# Patient Record
Sex: MALE | Race: WHITE | NOT HISPANIC OR LATINO | Employment: OTHER | URBAN - METROPOLITAN AREA
[De-identification: names, ages, dates, MRNs, and addresses within clinical notes are randomized per-mention and may not be internally consistent; named-entity substitution may affect disease eponyms.]

---

## 2023-01-01 ENCOUNTER — APPOINTMENT (OUTPATIENT)
Dept: RADIOLOGY | Facility: MEDICAL CENTER | Age: 63
DRG: 025 | End: 2023-01-01
Attending: NEUROLOGICAL SURGERY
Payer: MEDICAID

## 2023-01-01 ENCOUNTER — APPOINTMENT (OUTPATIENT)
Dept: MEDICAL GROUP | Facility: CLINIC | Age: 63
End: 2023-01-01
Payer: MEDICAID

## 2023-01-01 ENCOUNTER — APPOINTMENT (OUTPATIENT)
Dept: CARDIOLOGY | Facility: MEDICAL CENTER | Age: 63
DRG: 025 | End: 2023-01-01
Attending: NURSE PRACTITIONER
Payer: MEDICAID

## 2023-01-01 ENCOUNTER — PRE-ADMISSION TESTING (OUTPATIENT)
Dept: ADMISSIONS | Facility: MEDICAL CENTER | Age: 63
DRG: 025 | End: 2023-01-01
Attending: NEUROLOGICAL SURGERY
Payer: MEDICAID

## 2023-01-01 ENCOUNTER — ANESTHESIA (OUTPATIENT)
Dept: SURGERY | Facility: MEDICAL CENTER | Age: 63
DRG: 025 | End: 2023-01-01
Payer: MEDICAID

## 2023-01-01 ENCOUNTER — TELEPHONE (OUTPATIENT)
Dept: PALLIATIVE MEDICINE | Facility: HOSPICE | Age: 63
End: 2023-01-01

## 2023-01-01 ENCOUNTER — HOSPITAL ENCOUNTER (INPATIENT)
Facility: MEDICAL CENTER | Age: 63
LOS: 5 days | DRG: 025 | End: 2023-03-20
Attending: NEUROLOGICAL SURGERY | Admitting: NEUROLOGICAL SURGERY
Payer: MEDICAID

## 2023-01-01 ENCOUNTER — ANESTHESIA EVENT (OUTPATIENT)
Dept: SURGERY | Facility: MEDICAL CENTER | Age: 63
DRG: 025 | End: 2023-01-01
Payer: MEDICAID

## 2023-01-01 ENCOUNTER — TELEPHONE (OUTPATIENT)
Dept: PALLIATIVE MEDICINE | Facility: HOSPICE | Age: 63
End: 2023-01-01
Payer: MEDICAID

## 2023-01-01 ENCOUNTER — OFFICE VISIT (OUTPATIENT)
Dept: MEDICAL GROUP | Facility: CLINIC | Age: 63
End: 2023-01-01
Payer: MEDICAID

## 2023-01-01 VITALS
SYSTOLIC BLOOD PRESSURE: 116 MMHG | HEART RATE: 66 BPM | TEMPERATURE: 97.5 F | BODY MASS INDEX: 24.6 KG/M2 | DIASTOLIC BLOOD PRESSURE: 78 MMHG | RESPIRATION RATE: 16 BRPM | WEIGHT: 185.63 LBS | HEIGHT: 73 IN | OXYGEN SATURATION: 95 %

## 2023-01-01 VITALS
DIASTOLIC BLOOD PRESSURE: 74 MMHG | HEIGHT: 74 IN | HEART RATE: 76 BPM | TEMPERATURE: 98.2 F | WEIGHT: 185 LBS | SYSTOLIC BLOOD PRESSURE: 122 MMHG | BODY MASS INDEX: 23.74 KG/M2 | RESPIRATION RATE: 18 BRPM | OXYGEN SATURATION: 94 %

## 2023-01-01 DIAGNOSIS — C71.9 GLIOMA (HCC): ICD-10-CM

## 2023-01-01 DIAGNOSIS — R47.01 COMBINED RECEPTIVE AND EXPRESSIVE APHASIA: ICD-10-CM

## 2023-01-01 DIAGNOSIS — C71.9 GLIOBLASTOMA DETERMINED BY BIOPSY OF BRAIN (HCC): ICD-10-CM

## 2023-01-01 LAB
ABO + RH BLD: NORMAL
ABO GROUP BLD: NORMAL
ANION GAP SERPL CALC-SCNC: 10 MMOL/L (ref 7–16)
ANION GAP SERPL CALC-SCNC: 11 MMOL/L (ref 7–16)
ANION GAP SERPL CALC-SCNC: 8 MMOL/L (ref 7–16)
APTT PPP: <20 SEC (ref 24.7–36)
BASOPHILS # BLD AUTO: 0.1 % (ref 0–1.8)
BASOPHILS # BLD: 0.01 K/UL (ref 0–0.12)
BLD GP AB SCN SERPL QL: NORMAL
BUN SERPL-MCNC: 23 MG/DL (ref 8–22)
BUN SERPL-MCNC: 23 MG/DL (ref 8–22)
BUN SERPL-MCNC: 33 MG/DL (ref 8–22)
CALCIUM SERPL-MCNC: 8.2 MG/DL (ref 8.5–10.5)
CALCIUM SERPL-MCNC: 8.2 MG/DL (ref 8.5–10.5)
CALCIUM SERPL-MCNC: 8.7 MG/DL (ref 8.5–10.5)
CHLORIDE SERPL-SCNC: 104 MMOL/L (ref 96–112)
CHLORIDE SERPL-SCNC: 104 MMOL/L (ref 96–112)
CHLORIDE SERPL-SCNC: 107 MMOL/L (ref 96–112)
CO2 SERPL-SCNC: 21 MMOL/L (ref 20–33)
CO2 SERPL-SCNC: 22 MMOL/L (ref 20–33)
CO2 SERPL-SCNC: 23 MMOL/L (ref 20–33)
CREAT SERPL-MCNC: 0.85 MG/DL (ref 0.5–1.4)
CREAT SERPL-MCNC: 0.89 MG/DL (ref 0.5–1.4)
CREAT SERPL-MCNC: 1.07 MG/DL (ref 0.5–1.4)
EKG IMPRESSION: NORMAL
EKG IMPRESSION: NORMAL
EOSINOPHIL # BLD AUTO: 0 K/UL (ref 0–0.51)
EOSINOPHIL NFR BLD: 0 % (ref 0–6.9)
ERYTHROCYTE [DISTWIDTH] IN BLOOD BY AUTOMATED COUNT: 46.5 FL (ref 35.9–50)
ERYTHROCYTE [DISTWIDTH] IN BLOOD BY AUTOMATED COUNT: 46.5 FL (ref 35.9–50)
ERYTHROCYTE [DISTWIDTH] IN BLOOD BY AUTOMATED COUNT: 47.1 FL (ref 35.9–50)
GFR SERPLBLD CREATININE-BSD FMLA CKD-EPI: 77 ML/MIN/1.73 M 2
GFR SERPLBLD CREATININE-BSD FMLA CKD-EPI: 96 ML/MIN/1.73 M 2
GFR SERPLBLD CREATININE-BSD FMLA CKD-EPI: 97 ML/MIN/1.73 M 2
GLUCOSE SERPL-MCNC: 113 MG/DL (ref 65–99)
GLUCOSE SERPL-MCNC: 175 MG/DL (ref 65–99)
GLUCOSE SERPL-MCNC: 92 MG/DL (ref 65–99)
HCT VFR BLD AUTO: 42.4 % (ref 42–52)
HCT VFR BLD AUTO: 44.1 % (ref 42–52)
HCT VFR BLD AUTO: 45.2 % (ref 42–52)
HGB BLD-MCNC: 14.5 G/DL (ref 14–18)
HGB BLD-MCNC: 14.8 G/DL (ref 14–18)
HGB BLD-MCNC: 15.4 G/DL (ref 14–18)
IMM GRANULOCYTES # BLD AUTO: 0.15 K/UL (ref 0–0.11)
IMM GRANULOCYTES NFR BLD AUTO: 1.2 % (ref 0–0.9)
INR PPP: 1.03 (ref 0.87–1.13)
LV EJECT FRACT  99904: 70
LV EJECT FRACT MOD 2C 99903: 57.61
LV EJECT FRACT MOD 4C 99902: 50.17
LV EJECT FRACT MOD BP 99901: 59.01
LYMPHOCYTES # BLD AUTO: 0.28 K/UL (ref 1–4.8)
LYMPHOCYTES NFR BLD: 2.2 % (ref 22–41)
MAGNESIUM SERPL-MCNC: 1.9 MG/DL (ref 1.5–2.5)
MAGNESIUM SERPL-MCNC: 1.9 MG/DL (ref 1.5–2.5)
MCH RBC QN AUTO: 32.2 PG (ref 27–33)
MCH RBC QN AUTO: 32.4 PG (ref 27–33)
MCH RBC QN AUTO: 32.5 PG (ref 27–33)
MCHC RBC AUTO-ENTMCNC: 33.6 G/DL (ref 33.7–35.3)
MCHC RBC AUTO-ENTMCNC: 34.1 G/DL (ref 33.7–35.3)
MCHC RBC AUTO-ENTMCNC: 34.2 G/DL (ref 33.7–35.3)
MCV RBC AUTO: 94.2 FL (ref 81.4–97.8)
MCV RBC AUTO: 95 FL (ref 81.4–97.8)
MCV RBC AUTO: 96.7 FL (ref 81.4–97.8)
MONOCYTES # BLD AUTO: 0.68 K/UL (ref 0–0.85)
MONOCYTES NFR BLD AUTO: 5.3 % (ref 0–13.4)
NEUTROPHILS # BLD AUTO: 11.75 K/UL (ref 1.82–7.42)
NEUTROPHILS NFR BLD: 91.2 % (ref 44–72)
NRBC # BLD AUTO: 0 K/UL
NRBC BLD-RTO: 0 /100 WBC
PATHOLOGY CONSULT NOTE: NORMAL
PLATELET # BLD AUTO: 184 K/UL (ref 164–446)
PLATELET # BLD AUTO: 192 K/UL (ref 164–446)
PLATELET # BLD AUTO: 200 K/UL (ref 164–446)
PMV BLD AUTO: 9.1 FL (ref 9–12.9)
PMV BLD AUTO: 9.2 FL (ref 9–12.9)
PMV BLD AUTO: 9.5 FL (ref 9–12.9)
POTASSIUM SERPL-SCNC: 4.2 MMOL/L (ref 3.6–5.5)
POTASSIUM SERPL-SCNC: 4.2 MMOL/L (ref 3.6–5.5)
POTASSIUM SERPL-SCNC: 4.4 MMOL/L (ref 3.6–5.5)
PROTHROMBIN TIME: 13.4 SEC (ref 12–14.6)
RBC # BLD AUTO: 4.5 M/UL (ref 4.7–6.1)
RBC # BLD AUTO: 4.56 M/UL (ref 4.7–6.1)
RBC # BLD AUTO: 4.76 M/UL (ref 4.7–6.1)
RH BLD: NORMAL
SODIUM SERPL-SCNC: 135 MMOL/L (ref 135–145)
SODIUM SERPL-SCNC: 136 MMOL/L (ref 135–145)
SODIUM SERPL-SCNC: 139 MMOL/L (ref 135–145)
WBC # BLD AUTO: 12.9 K/UL (ref 4.8–10.8)
WBC # BLD AUTO: 13.3 K/UL (ref 4.8–10.8)
WBC # BLD AUTO: 14.7 K/UL (ref 4.8–10.8)

## 2023-01-01 PROCEDURE — A9270 NON-COVERED ITEM OR SERVICE: HCPCS | Performed by: NURSE PRACTITIONER

## 2023-01-01 PROCEDURE — 92610 EVALUATE SWALLOWING FUNCTION: CPT

## 2023-01-01 PROCEDURE — 36415 COLL VENOUS BLD VENIPUNCTURE: CPT

## 2023-01-01 PROCEDURE — 99204 OFFICE O/P NEW MOD 45 MIN: CPT | Mod: GC | Performed by: STUDENT IN AN ORGANIZED HEALTH CARE EDUCATION/TRAINING PROGRAM

## 2023-01-01 PROCEDURE — 36620 INSERTION CATHETER ARTERY: CPT | Performed by: ANESTHESIOLOGY

## 2023-01-01 PROCEDURE — 99254 IP/OBS CNSLTJ NEW/EST MOD 60: CPT | Performed by: INTERNAL MEDICINE

## 2023-01-01 PROCEDURE — 99232 SBSQ HOSP IP/OBS MODERATE 35: CPT | Mod: GC | Performed by: INTERNAL MEDICINE

## 2023-01-01 PROCEDURE — 700101 HCHG RX REV CODE 250: Performed by: ANESTHESIOLOGY

## 2023-01-01 PROCEDURE — 160048 HCHG OR STATISTICAL LEVEL 1-5: Performed by: NEUROLOGICAL SURGERY

## 2023-01-01 PROCEDURE — 93306 TTE W/DOPPLER COMPLETE: CPT

## 2023-01-01 PROCEDURE — 700102 HCHG RX REV CODE 250 W/ 637 OVERRIDE(OP): Performed by: NURSE PRACTITIONER

## 2023-01-01 PROCEDURE — 83735 ASSAY OF MAGNESIUM: CPT

## 2023-01-01 PROCEDURE — 85730 THROMBOPLASTIN TIME PARTIAL: CPT

## 2023-01-01 PROCEDURE — 93005 ELECTROCARDIOGRAM TRACING: CPT | Performed by: NEUROLOGICAL SURGERY

## 2023-01-01 PROCEDURE — 160009 HCHG ANES TIME/MIN: Performed by: NEUROLOGICAL SURGERY

## 2023-01-01 PROCEDURE — 770020 HCHG ROOM/CARE - TELE (206)

## 2023-01-01 PROCEDURE — 88307 TISSUE EXAM BY PATHOLOGIST: CPT

## 2023-01-01 PROCEDURE — 86850 RBC ANTIBODY SCREEN: CPT

## 2023-01-01 PROCEDURE — 160031 HCHG SURGERY MINUTES - 1ST 30 MINS LEVEL 5: Performed by: NEUROLOGICAL SURGERY

## 2023-01-01 PROCEDURE — 700111 HCHG RX REV CODE 636 W/ 250 OVERRIDE (IP): Performed by: ANESTHESIOLOGY

## 2023-01-01 PROCEDURE — 80048 BASIC METABOLIC PNL TOTAL CA: CPT

## 2023-01-01 PROCEDURE — 700111 HCHG RX REV CODE 636 W/ 250 OVERRIDE (IP): Performed by: NURSE PRACTITIONER

## 2023-01-01 PROCEDURE — 700105 HCHG RX REV CODE 258: Performed by: ANESTHESIOLOGY

## 2023-01-01 PROCEDURE — 99291 CRITICAL CARE FIRST HOUR: CPT | Performed by: INTERNAL MEDICINE

## 2023-01-01 PROCEDURE — 86900 BLOOD TYPING SEROLOGIC ABO: CPT

## 2023-01-01 PROCEDURE — 160002 HCHG RECOVERY MINUTES (STAT): Performed by: NEUROLOGICAL SURGERY

## 2023-01-01 PROCEDURE — 700111 HCHG RX REV CODE 636 W/ 250 OVERRIDE (IP): Performed by: NEUROLOGICAL SURGERY

## 2023-01-01 PROCEDURE — 700117 HCHG RX CONTRAST REV CODE 255: Performed by: NEUROLOGICAL SURGERY

## 2023-01-01 PROCEDURE — 770006 HCHG ROOM/CARE - MED/SURG/GYN SEMI*

## 2023-01-01 PROCEDURE — 700105 HCHG RX REV CODE 258: Performed by: NURSE PRACTITIONER

## 2023-01-01 PROCEDURE — 8E093CZ ROBOTIC ASSISTED PROCEDURE OF HEAD AND NECK REGION, PERCUTANEOUS APPROACH: ICD-10-PCS | Performed by: NEUROLOGICAL SURGERY

## 2023-01-01 PROCEDURE — 86901 BLOOD TYPING SEROLOGIC RH(D): CPT

## 2023-01-01 PROCEDURE — 97163 PT EVAL HIGH COMPLEX 45 MIN: CPT

## 2023-01-01 PROCEDURE — 700101 HCHG RX REV CODE 250: Performed by: NEUROLOGICAL SURGERY

## 2023-01-01 PROCEDURE — 700117 HCHG RX CONTRAST REV CODE 255: Performed by: NURSE PRACTITIONER

## 2023-01-01 PROCEDURE — 97167 OT EVAL HIGH COMPLEX 60 MIN: CPT

## 2023-01-01 PROCEDURE — 00210 ANES INTRACRANIAL PX NOS: CPT | Performed by: ANESTHESIOLOGY

## 2023-01-01 PROCEDURE — 97166 OT EVAL MOD COMPLEX 45 MIN: CPT

## 2023-01-01 PROCEDURE — 00B73ZX EXCISION OF CEREBRAL HEMISPHERE, PERCUTANEOUS APPROACH, DIAGNOSTIC: ICD-10-PCS | Performed by: NEUROLOGICAL SURGERY

## 2023-01-01 PROCEDURE — 160036 HCHG PACU - EA ADDL 30 MINS PHASE I: Performed by: NEUROLOGICAL SURGERY

## 2023-01-01 PROCEDURE — 99233 SBSQ HOSP IP/OBS HIGH 50: CPT | Mod: FS | Performed by: INTERNAL MEDICINE

## 2023-01-01 PROCEDURE — 85027 COMPLETE CBC AUTOMATED: CPT

## 2023-01-01 PROCEDURE — A9579 GAD-BASE MR CONTRAST NOS,1ML: HCPCS | Performed by: NEUROLOGICAL SURGERY

## 2023-01-01 PROCEDURE — 70553 MRI BRAIN STEM W/O & W/DYE: CPT

## 2023-01-01 PROCEDURE — 93005 ELECTROCARDIOGRAM TRACING: CPT | Performed by: STUDENT IN AN ORGANIZED HEALTH CARE EDUCATION/TRAINING PROGRAM

## 2023-01-01 PROCEDURE — 96105 ASSESSMENT OF APHASIA: CPT

## 2023-01-01 PROCEDURE — 160035 HCHG PACU - 1ST 60 MINS PHASE I: Performed by: NEUROLOGICAL SURGERY

## 2023-01-01 PROCEDURE — 93306 TTE W/DOPPLER COMPLETE: CPT | Mod: 26 | Performed by: INTERNAL MEDICINE

## 2023-01-01 PROCEDURE — 85025 COMPLETE CBC W/AUTO DIFF WBC: CPT

## 2023-01-01 PROCEDURE — 160042 HCHG SURGERY MINUTES - EA ADDL 1 MIN LEVEL 5: Performed by: NEUROLOGICAL SURGERY

## 2023-01-01 PROCEDURE — 88331 PATH CONSLTJ SURG 1 BLK 1SPC: CPT

## 2023-01-01 PROCEDURE — 502714 HCHG ROBOTIC SURGERY SERVICES: Performed by: NEUROLOGICAL SURGERY

## 2023-01-01 PROCEDURE — 85610 PROTHROMBIN TIME: CPT

## 2023-01-01 PROCEDURE — 51798 US URINE CAPACITY MEASURE: CPT

## 2023-01-01 PROCEDURE — 93010 ELECTROCARDIOGRAM REPORT: CPT | Performed by: INTERNAL MEDICINE

## 2023-01-01 PROCEDURE — 770022 HCHG ROOM/CARE - ICU (200)

## 2023-01-01 PROCEDURE — 110454 HCHG SHELL REV 250: Performed by: NEUROLOGICAL SURGERY

## 2023-01-01 RX ORDER — DEXAMETHASONE 4 MG/1
1 TABLET ORAL 4 TIMES DAILY
COMMUNITY
End: 2023-01-01 | Stop reason: SDUPTHER

## 2023-01-01 RX ORDER — LIDOCAINE HYDROCHLORIDE 20 MG/ML
INJECTION, SOLUTION EPIDURAL; INFILTRATION; INTRACAUDAL; PERINEURAL PRN
Status: DISCONTINUED | OUTPATIENT
Start: 2023-01-01 | End: 2023-01-01 | Stop reason: SURG

## 2023-01-01 RX ORDER — ACETAMINOPHEN 500 MG
1000 TABLET ORAL EVERY 6 HOURS
Status: DISCONTINUED | OUTPATIENT
Start: 2023-01-01 | End: 2023-01-01 | Stop reason: HOSPADM

## 2023-01-01 RX ORDER — DOCUSATE SODIUM 100 MG/1
100 CAPSULE, LIQUID FILLED ORAL 2 TIMES DAILY
Status: DISCONTINUED | OUTPATIENT
Start: 2023-01-01 | End: 2023-01-01 | Stop reason: HOSPADM

## 2023-01-01 RX ORDER — DEXAMETHASONE 4 MG/1
4 TABLET ORAL 3 TIMES DAILY
COMMUNITY

## 2023-01-01 RX ORDER — DEXAMETHASONE 6 MG/1
6 TABLET ORAL EVERY 8 HOURS
Status: DISCONTINUED | OUTPATIENT
Start: 2023-01-01 | End: 2023-01-01 | Stop reason: HOSPADM

## 2023-01-01 RX ORDER — LEVETIRACETAM 500 MG/1
500 TABLET ORAL 2 TIMES DAILY
Qty: 180 TABLET | Refills: 1 | Status: SHIPPED | OUTPATIENT
Start: 2023-01-01

## 2023-01-01 RX ORDER — SODIUM CHLORIDE 9 MG/ML
INJECTION, SOLUTION INTRAVENOUS
Status: DISCONTINUED | OUTPATIENT
Start: 2023-01-01 | End: 2023-01-01 | Stop reason: SURG

## 2023-01-01 RX ORDER — AMOXICILLIN 250 MG
1 CAPSULE ORAL NIGHTLY
Status: DISCONTINUED | OUTPATIENT
Start: 2023-01-01 | End: 2023-01-01 | Stop reason: HOSPADM

## 2023-01-01 RX ORDER — OXYCODONE HYDROCHLORIDE 10 MG/1
10 TABLET ORAL
Status: DISCONTINUED | OUTPATIENT
Start: 2023-01-01 | End: 2023-01-01 | Stop reason: HOSPADM

## 2023-01-01 RX ORDER — SODIUM CHLORIDE 9 MG/ML
INJECTION, SOLUTION INTRAVENOUS CONTINUOUS
Status: DISCONTINUED | OUTPATIENT
Start: 2023-01-01 | End: 2023-01-01

## 2023-01-01 RX ORDER — POLYETHYLENE GLYCOL 3350 17 G/17G
1 POWDER, FOR SOLUTION ORAL 2 TIMES DAILY PRN
Status: DISCONTINUED | OUTPATIENT
Start: 2023-01-01 | End: 2023-01-01 | Stop reason: HOSPADM

## 2023-01-01 RX ORDER — DEXAMETHASONE SODIUM PHOSPHATE 4 MG/ML
10 INJECTION, SOLUTION INTRA-ARTICULAR; INTRALESIONAL; INTRAMUSCULAR; INTRAVENOUS; SOFT TISSUE EVERY 12 HOURS
Status: COMPLETED | OUTPATIENT
Start: 2023-01-01 | End: 2023-01-01

## 2023-01-01 RX ORDER — OXYCODONE HYDROCHLORIDE 5 MG/1
5 TABLET ORAL
Status: DISCONTINUED | OUTPATIENT
Start: 2023-01-01 | End: 2023-01-01 | Stop reason: HOSPADM

## 2023-01-01 RX ORDER — GLYCOPYRROLATE 0.2 MG/ML
INJECTION INTRAMUSCULAR; INTRAVENOUS PRN
Status: DISCONTINUED | OUTPATIENT
Start: 2023-01-01 | End: 2023-01-01 | Stop reason: SURG

## 2023-01-01 RX ORDER — DIPHENHYDRAMINE HYDROCHLORIDE 50 MG/ML
12.5 INJECTION INTRAMUSCULAR; INTRAVENOUS
Status: DISCONTINUED | OUTPATIENT
Start: 2023-01-01 | End: 2023-01-01 | Stop reason: HOSPADM

## 2023-01-01 RX ORDER — DIPHENHYDRAMINE HCL 25 MG
25 TABLET ORAL EVERY 6 HOURS PRN
Status: DISCONTINUED | OUTPATIENT
Start: 2023-01-01 | End: 2023-01-01 | Stop reason: HOSPADM

## 2023-01-01 RX ORDER — DEXAMETHASONE 6 MG/1
6 TABLET ORAL EVERY 8 HOURS
Status: DISCONTINUED | OUTPATIENT
Start: 2023-01-01 | End: 2023-01-01

## 2023-01-01 RX ORDER — IPRATROPIUM BROMIDE AND ALBUTEROL SULFATE 2.5; .5 MG/3ML; MG/3ML
3 SOLUTION RESPIRATORY (INHALATION)
Status: DISCONTINUED | OUTPATIENT
Start: 2023-01-01 | End: 2023-01-01 | Stop reason: HOSPADM

## 2023-01-01 RX ORDER — ONDANSETRON 2 MG/ML
4 INJECTION INTRAMUSCULAR; INTRAVENOUS EVERY 4 HOURS PRN
Status: DISCONTINUED | OUTPATIENT
Start: 2023-01-01 | End: 2023-01-01 | Stop reason: HOSPADM

## 2023-01-01 RX ORDER — SODIUM CHLORIDE, SODIUM LACTATE, POTASSIUM CHLORIDE, CALCIUM CHLORIDE 600; 310; 30; 20 MG/100ML; MG/100ML; MG/100ML; MG/100ML
INJECTION, SOLUTION INTRAVENOUS CONTINUOUS
Status: ACTIVE | OUTPATIENT
Start: 2023-01-01 | End: 2023-01-01

## 2023-01-01 RX ORDER — DEXAMETHASONE SODIUM PHOSPHATE 4 MG/ML
INJECTION, SOLUTION INTRA-ARTICULAR; INTRALESIONAL; INTRAMUSCULAR; INTRAVENOUS; SOFT TISSUE PRN
Status: DISCONTINUED | OUTPATIENT
Start: 2023-01-01 | End: 2023-01-01 | Stop reason: SURG

## 2023-01-01 RX ORDER — HYDRALAZINE HYDROCHLORIDE 20 MG/ML
5 INJECTION INTRAMUSCULAR; INTRAVENOUS
Status: DISCONTINUED | OUTPATIENT
Start: 2023-01-01 | End: 2023-01-01 | Stop reason: HOSPADM

## 2023-01-01 RX ORDER — CLONIDINE HYDROCHLORIDE 0.1 MG/1
0.1 TABLET ORAL EVERY 4 HOURS PRN
Status: DISCONTINUED | OUTPATIENT
Start: 2023-01-01 | End: 2023-01-01 | Stop reason: HOSPADM

## 2023-01-01 RX ORDER — OXYCODONE HCL 5 MG/5 ML
5 SOLUTION, ORAL ORAL
Status: DISCONTINUED | OUTPATIENT
Start: 2023-01-01 | End: 2023-01-01 | Stop reason: HOSPADM

## 2023-01-01 RX ORDER — BUPIVACAINE HYDROCHLORIDE AND EPINEPHRINE 5; 5 MG/ML; UG/ML
INJECTION, SOLUTION PERINEURAL
Status: DISCONTINUED | OUTPATIENT
Start: 2023-01-01 | End: 2023-01-01 | Stop reason: HOSPADM

## 2023-01-01 RX ORDER — EPHEDRINE SULFATE 50 MG/ML
5 INJECTION, SOLUTION INTRAVENOUS
Status: DISCONTINUED | OUTPATIENT
Start: 2023-01-01 | End: 2023-01-01 | Stop reason: HOSPADM

## 2023-01-01 RX ORDER — FAMOTIDINE 20 MG/1
20 TABLET, FILM COATED ORAL 2 TIMES DAILY
Status: DISCONTINUED | OUTPATIENT
Start: 2023-01-01 | End: 2023-01-01 | Stop reason: HOSPADM

## 2023-01-01 RX ORDER — CEFAZOLIN SODIUM 1 G/3ML
INJECTION, POWDER, FOR SOLUTION INTRAMUSCULAR; INTRAVENOUS PRN
Status: DISCONTINUED | OUTPATIENT
Start: 2023-01-01 | End: 2023-01-01 | Stop reason: SURG

## 2023-01-01 RX ORDER — OXYCODONE HCL 5 MG/5 ML
10 SOLUTION, ORAL ORAL
Status: DISCONTINUED | OUTPATIENT
Start: 2023-01-01 | End: 2023-01-01 | Stop reason: HOSPADM

## 2023-01-01 RX ORDER — MORPHINE SULFATE 4 MG/ML
2-4 INJECTION INTRAVENOUS
Status: DISCONTINUED | OUTPATIENT
Start: 2023-01-01 | End: 2023-01-01 | Stop reason: HOSPADM

## 2023-01-01 RX ORDER — ONDANSETRON 2 MG/ML
INJECTION INTRAMUSCULAR; INTRAVENOUS PRN
Status: DISCONTINUED | OUTPATIENT
Start: 2023-01-01 | End: 2023-01-01 | Stop reason: SURG

## 2023-01-01 RX ORDER — DIPHENHYDRAMINE HYDROCHLORIDE 50 MG/ML
25 INJECTION INTRAMUSCULAR; INTRAVENOUS EVERY 6 HOURS PRN
Status: DISCONTINUED | OUTPATIENT
Start: 2023-01-01 | End: 2023-01-01 | Stop reason: HOSPADM

## 2023-01-01 RX ORDER — HYDROMORPHONE HYDROCHLORIDE 1 MG/ML
0.4 INJECTION, SOLUTION INTRAMUSCULAR; INTRAVENOUS; SUBCUTANEOUS
Status: DISCONTINUED | OUTPATIENT
Start: 2023-01-01 | End: 2023-01-01 | Stop reason: HOSPADM

## 2023-01-01 RX ORDER — ONDANSETRON 2 MG/ML
4 INJECTION INTRAMUSCULAR; INTRAVENOUS
Status: DISCONTINUED | OUTPATIENT
Start: 2023-01-01 | End: 2023-01-01 | Stop reason: HOSPADM

## 2023-01-01 RX ORDER — DEXAMETHASONE 4 MG/1
4 TABLET ORAL 4 TIMES DAILY
Qty: 120 TABLET | Refills: 0 | Status: ON HOLD | OUTPATIENT
Start: 2023-01-01 | End: 2023-01-01

## 2023-01-01 RX ORDER — HYDROMORPHONE HYDROCHLORIDE 1 MG/ML
0.2 INJECTION, SOLUTION INTRAMUSCULAR; INTRAVENOUS; SUBCUTANEOUS
Status: DISCONTINUED | OUTPATIENT
Start: 2023-01-01 | End: 2023-01-01 | Stop reason: HOSPADM

## 2023-01-01 RX ORDER — METOPROLOL TARTRATE 1 MG/ML
1 INJECTION, SOLUTION INTRAVENOUS
Status: DISCONTINUED | OUTPATIENT
Start: 2023-01-01 | End: 2023-01-01 | Stop reason: HOSPADM

## 2023-01-01 RX ORDER — LEVETIRACETAM 500 MG/1
500 TABLET ORAL 2 TIMES DAILY
Status: DISCONTINUED | OUTPATIENT
Start: 2023-01-01 | End: 2023-01-01 | Stop reason: HOSPADM

## 2023-01-01 RX ORDER — ACETAMINOPHEN 500 MG
1000 TABLET ORAL EVERY 6 HOURS PRN
Status: DISCONTINUED | OUTPATIENT
Start: 2023-01-01 | End: 2023-01-01 | Stop reason: HOSPADM

## 2023-01-01 RX ORDER — BISACODYL 10 MG
10 SUPPOSITORY, RECTAL RECTAL
Status: DISCONTINUED | OUTPATIENT
Start: 2023-01-01 | End: 2023-01-01 | Stop reason: HOSPADM

## 2023-01-01 RX ORDER — ROCURONIUM BROMIDE 10 MG/ML
INJECTION, SOLUTION INTRAVENOUS PRN
Status: DISCONTINUED | OUTPATIENT
Start: 2023-01-01 | End: 2023-01-01 | Stop reason: SURG

## 2023-01-01 RX ORDER — HALOPERIDOL 5 MG/ML
1 INJECTION INTRAMUSCULAR
Status: DISCONTINUED | OUTPATIENT
Start: 2023-01-01 | End: 2023-01-01 | Stop reason: HOSPADM

## 2023-01-01 RX ORDER — AMOXICILLIN 250 MG
1 CAPSULE ORAL
Status: DISCONTINUED | OUTPATIENT
Start: 2023-01-01 | End: 2023-01-01 | Stop reason: HOSPADM

## 2023-01-01 RX ORDER — CEFAZOLIN SODIUM 1 G/3ML
INJECTION, POWDER, FOR SOLUTION INTRAMUSCULAR; INTRAVENOUS
Status: DISCONTINUED | OUTPATIENT
Start: 2023-01-01 | End: 2023-01-01 | Stop reason: HOSPADM

## 2023-01-01 RX ORDER — ENEMA 19; 7 G/133ML; G/133ML
1 ENEMA RECTAL
Status: DISCONTINUED | OUTPATIENT
Start: 2023-01-01 | End: 2023-01-01 | Stop reason: HOSPADM

## 2023-01-01 RX ORDER — LEVETIRACETAM 500 MG/1
500 TABLET ORAL 2 TIMES DAILY
COMMUNITY
End: 2023-01-01 | Stop reason: SDUPTHER

## 2023-01-01 RX ORDER — HYDROMORPHONE HYDROCHLORIDE 1 MG/ML
0.1 INJECTION, SOLUTION INTRAMUSCULAR; INTRAVENOUS; SUBCUTANEOUS
Status: DISCONTINUED | OUTPATIENT
Start: 2023-01-01 | End: 2023-01-01 | Stop reason: HOSPADM

## 2023-01-01 RX ORDER — LABETALOL HYDROCHLORIDE 5 MG/ML
10 INJECTION, SOLUTION INTRAVENOUS
Status: DISCONTINUED | OUTPATIENT
Start: 2023-01-01 | End: 2023-01-01 | Stop reason: HOSPADM

## 2023-01-01 RX ADMIN — LEVETIRACETAM 500 MG: 500 TABLET, FILM COATED ORAL at 04:18

## 2023-01-01 RX ADMIN — FENTANYL CITRATE 50 MCG: 50 INJECTION, SOLUTION INTRAMUSCULAR; INTRAVENOUS at 14:40

## 2023-01-01 RX ADMIN — FAMOTIDINE 20 MG: 20 TABLET ORAL at 17:14

## 2023-01-01 RX ADMIN — DEXAMETHASONE 6 MG: 6 TABLET ORAL at 09:31

## 2023-01-01 RX ADMIN — DEXAMETHASONE 6 MG: 6 TABLET ORAL at 00:05

## 2023-01-01 RX ADMIN — DEXAMETHASONE 6 MG: 6 TABLET ORAL at 00:19

## 2023-01-01 RX ADMIN — DEXAMETHASONE 6 MG: 6 TABLET ORAL at 13:09

## 2023-01-01 RX ADMIN — SODIUM CHLORIDE: 9 INJECTION, SOLUTION INTRAVENOUS at 09:22

## 2023-01-01 RX ADMIN — SODIUM CHLORIDE: 9 INJECTION, SOLUTION INTRAVENOUS at 22:49

## 2023-01-01 RX ADMIN — LEVETIRACETAM 500 MG: 500 TABLET, FILM COATED ORAL at 17:14

## 2023-01-01 RX ADMIN — LEVETIRACETAM 500 MG: 500 TABLET, FILM COATED ORAL at 17:17

## 2023-01-01 RX ADMIN — DEXAMETHASONE 6 MG: 6 TABLET ORAL at 17:21

## 2023-01-01 RX ADMIN — DOCUSATE SODIUM 100 MG: 100 CAPSULE, LIQUID FILLED ORAL at 17:03

## 2023-01-01 RX ADMIN — DEXAMETHASONE 6 MG: 6 TABLET ORAL at 17:14

## 2023-01-01 RX ADMIN — LEVETIRACETAM 500 MG: 500 TABLET, FILM COATED ORAL at 05:36

## 2023-01-01 RX ADMIN — FENTANYL CITRATE 50 MCG: 50 INJECTION, SOLUTION INTRAMUSCULAR; INTRAVENOUS at 14:47

## 2023-01-01 RX ADMIN — ACETAMINOPHEN 1000 MG: 500 TABLET, FILM COATED ORAL at 05:18

## 2023-01-01 RX ADMIN — DOCUSATE SODIUM 100 MG: 100 CAPSULE, LIQUID FILLED ORAL at 17:18

## 2023-01-01 RX ADMIN — DEXAMETHASONE 6 MG: 6 TABLET ORAL at 10:50

## 2023-01-01 RX ADMIN — CEFAZOLIN 2 G: 2 INJECTION, POWDER, FOR SOLUTION INTRAMUSCULAR; INTRAVENOUS at 20:09

## 2023-01-01 RX ADMIN — OXYCODONE HYDROCHLORIDE 5 MG: 5 TABLET ORAL at 10:10

## 2023-01-01 RX ADMIN — SODIUM CHLORIDE: 9 INJECTION, SOLUTION INTRAVENOUS at 12:55

## 2023-01-01 RX ADMIN — FAMOTIDINE 20 MG: 20 TABLET ORAL at 05:18

## 2023-01-01 RX ADMIN — DOCUSATE SODIUM 100 MG: 100 CAPSULE, LIQUID FILLED ORAL at 04:18

## 2023-01-01 RX ADMIN — FENTANYL CITRATE 100 MCG: 50 INJECTION, SOLUTION INTRAMUSCULAR; INTRAVENOUS at 12:13

## 2023-01-01 RX ADMIN — ONDANSETRON 4 MG: 2 INJECTION INTRAMUSCULAR; INTRAVENOUS at 13:33

## 2023-01-01 RX ADMIN — LEVETIRACETAM 500 MG: 500 TABLET, FILM COATED ORAL at 17:09

## 2023-01-01 RX ADMIN — ACETAMINOPHEN 1000 MG: 500 TABLET, FILM COATED ORAL at 23:59

## 2023-01-01 RX ADMIN — ACETAMINOPHEN 1000 MG: 500 TABLET, FILM COATED ORAL at 05:36

## 2023-01-01 RX ADMIN — HYDROMORPHONE HYDROCHLORIDE 0.4 MG: 1 INJECTION, SOLUTION INTRAMUSCULAR; INTRAVENOUS; SUBCUTANEOUS at 14:56

## 2023-01-01 RX ADMIN — CEFAZOLIN 2 G: 2 INJECTION, POWDER, FOR SOLUTION INTRAMUSCULAR; INTRAVENOUS at 05:00

## 2023-01-01 RX ADMIN — FAMOTIDINE 20 MG: 20 TABLET ORAL at 17:17

## 2023-01-01 RX ADMIN — LEVETIRACETAM 500 MG: 500 TABLET, FILM COATED ORAL at 05:38

## 2023-01-01 RX ADMIN — SENNOSIDES AND DOCUSATE SODIUM 1 TABLET: 50; 8.6 TABLET ORAL at 19:49

## 2023-01-01 RX ADMIN — DOCUSATE SODIUM 100 MG: 100 CAPSULE, LIQUID FILLED ORAL at 05:18

## 2023-01-01 RX ADMIN — LEVETIRACETAM 500 MG: 500 TABLET, FILM COATED ORAL at 04:56

## 2023-01-01 RX ADMIN — SODIUM CHLORIDE: 9 INJECTION, SOLUTION INTRAVENOUS at 10:54

## 2023-01-01 RX ADMIN — ACETAMINOPHEN 1000 MG: 500 TABLET, FILM COATED ORAL at 17:10

## 2023-01-01 RX ADMIN — FENTANYL CITRATE 100 MCG: 50 INJECTION, SOLUTION INTRAMUSCULAR; INTRAVENOUS at 13:09

## 2023-01-01 RX ADMIN — ACETAMINOPHEN 1000 MG: 500 TABLET, FILM COATED ORAL at 04:56

## 2023-01-01 RX ADMIN — DEXAMETHASONE SODIUM PHOSPHATE 10 MG: 4 INJECTION, SOLUTION INTRA-ARTICULAR; INTRALESIONAL; INTRAMUSCULAR; INTRAVENOUS; SOFT TISSUE at 04:18

## 2023-01-01 RX ADMIN — SUGAMMADEX 200 MG: 100 INJECTION, SOLUTION INTRAVENOUS at 13:49

## 2023-01-01 RX ADMIN — LIDOCAINE HYDROCHLORIDE 100 MG: 20 INJECTION, SOLUTION EPIDURAL; INFILTRATION; INTRACAUDAL at 12:19

## 2023-01-01 RX ADMIN — DOCUSATE SODIUM 100 MG: 100 CAPSULE, LIQUID FILLED ORAL at 17:21

## 2023-01-01 RX ADMIN — LEVETIRACETAM 500 MG: 500 TABLET, FILM COATED ORAL at 05:18

## 2023-01-01 RX ADMIN — ACETAMINOPHEN 1000 MG: 500 TABLET, FILM COATED ORAL at 00:10

## 2023-01-01 RX ADMIN — FAMOTIDINE 20 MG: 20 TABLET ORAL at 04:56

## 2023-01-01 RX ADMIN — FAMOTIDINE 20 MG: 20 TABLET ORAL at 04:18

## 2023-01-01 RX ADMIN — DEXAMETHASONE 6 MG: 6 TABLET ORAL at 00:00

## 2023-01-01 RX ADMIN — DOCUSATE SODIUM 100 MG: 100 CAPSULE, LIQUID FILLED ORAL at 17:09

## 2023-01-01 RX ADMIN — SODIUM CHLORIDE: 9 INJECTION, SOLUTION INTRAVENOUS at 04:00

## 2023-01-01 RX ADMIN — DEXAMETHASONE 6 MG: 6 TABLET ORAL at 10:57

## 2023-01-01 RX ADMIN — CEFAZOLIN 2 G: 330 INJECTION, POWDER, FOR SOLUTION INTRAMUSCULAR; INTRAVENOUS at 12:45

## 2023-01-01 RX ADMIN — SODIUM CHLORIDE: 9 INJECTION, SOLUTION INTRAVENOUS at 16:03

## 2023-01-01 RX ADMIN — DEXAMETHASONE 6 MG: 6 TABLET ORAL at 00:10

## 2023-01-01 RX ADMIN — FAMOTIDINE 20 MG: 10 INJECTION, SOLUTION INTRAVENOUS at 05:37

## 2023-01-01 RX ADMIN — SODIUM CHLORIDE: 9 INJECTION, SOLUTION INTRAVENOUS at 13:44

## 2023-01-01 RX ADMIN — ACETAMINOPHEN 1000 MG: 500 TABLET, FILM COATED ORAL at 17:17

## 2023-01-01 RX ADMIN — FAMOTIDINE 20 MG: 20 TABLET ORAL at 17:09

## 2023-01-01 RX ADMIN — FAMOTIDINE 20 MG: 20 TABLET ORAL at 05:38

## 2023-01-01 RX ADMIN — ACETAMINOPHEN 1000 MG: 500 TABLET, FILM COATED ORAL at 11:25

## 2023-01-01 RX ADMIN — ACETAMINOPHEN 1000 MG: 500 TABLET, FILM COATED ORAL at 13:09

## 2023-01-01 RX ADMIN — HYDROMORPHONE HYDROCHLORIDE 0.4 MG: 1 INJECTION, SOLUTION INTRAMUSCULAR; INTRAVENOUS; SUBCUTANEOUS at 14:51

## 2023-01-01 RX ADMIN — ACETAMINOPHEN 1000 MG: 500 TABLET, FILM COATED ORAL at 17:21

## 2023-01-01 RX ADMIN — FAMOTIDINE 20 MG: 20 TABLET ORAL at 17:21

## 2023-01-01 RX ADMIN — DOCUSATE SODIUM 100 MG: 100 CAPSULE, LIQUID FILLED ORAL at 17:15

## 2023-01-01 RX ADMIN — SENNOSIDES AND DOCUSATE SODIUM 1 TABLET: 50; 8.6 TABLET ORAL at 22:35

## 2023-01-01 RX ADMIN — ROCURONIUM BROMIDE 50 MG: 10 INJECTION, SOLUTION INTRAVENOUS at 12:19

## 2023-01-01 RX ADMIN — ACETAMINOPHEN 1000 MG: 500 TABLET, FILM COATED ORAL at 00:20

## 2023-01-01 RX ADMIN — ACETAMINOPHEN 1000 MG: 500 TABLET, FILM COATED ORAL at 17:14

## 2023-01-01 RX ADMIN — DEXAMETHASONE 6 MG: 6 TABLET ORAL at 17:18

## 2023-01-01 RX ADMIN — MORPHINE SULFATE 2 MG: 4 INJECTION INTRAVENOUS at 11:28

## 2023-01-01 RX ADMIN — DEXAMETHASONE SODIUM PHOSPHATE 10 MG: 4 INJECTION, SOLUTION INTRA-ARTICULAR; INTRALESIONAL; INTRAMUSCULAR; INTRAVENOUS; SOFT TISSUE at 17:10

## 2023-01-01 RX ADMIN — PROPOFOL 200 MG: 10 INJECTION, EMULSION INTRAVENOUS at 12:19

## 2023-01-01 RX ADMIN — GLYCOPYRROLATE 0.2 MG: 0.2 INJECTION INTRAMUSCULAR; INTRAVENOUS at 13:00

## 2023-01-01 RX ADMIN — ACETAMINOPHEN 1000 MG: 500 TABLET, FILM COATED ORAL at 00:02

## 2023-01-01 RX ADMIN — LEVETIRACETAM 500 MG: 500 TABLET, FILM COATED ORAL at 17:03

## 2023-01-01 RX ADMIN — SODIUM CHLORIDE: 9 INJECTION, SOLUTION INTRAVENOUS at 12:09

## 2023-01-01 RX ADMIN — FAMOTIDINE 20 MG: 20 TABLET ORAL at 17:03

## 2023-01-01 RX ADMIN — ACETAMINOPHEN 1000 MG: 500 TABLET, FILM COATED ORAL at 05:38

## 2023-01-01 RX ADMIN — DEXAMETHASONE 6 MG: 6 TABLET ORAL at 17:03

## 2023-01-01 RX ADMIN — SODIUM CHLORIDE: 9 INJECTION, SOLUTION INTRAVENOUS at 18:21

## 2023-01-01 RX ADMIN — ROCURONIUM BROMIDE 20 MG: 10 INJECTION, SOLUTION INTRAVENOUS at 13:04

## 2023-01-01 RX ADMIN — GADOTERIDOL 16 ML: 279.3 INJECTION, SOLUTION INTRAVENOUS at 11:48

## 2023-01-01 RX ADMIN — DOCUSATE SODIUM 100 MG: 100 CAPSULE, LIQUID FILLED ORAL at 05:37

## 2023-01-01 RX ADMIN — LEVETIRACETAM 500 MG: 500 TABLET, FILM COATED ORAL at 17:21

## 2023-01-01 RX ADMIN — DEXAMETHASONE SODIUM PHOSPHATE 10 MG: 4 INJECTION, SOLUTION INTRA-ARTICULAR; INTRALESIONAL; INTRAMUSCULAR; INTRAVENOUS; SOFT TISSUE at 13:33

## 2023-01-01 RX ADMIN — HUMAN ALBUMIN MICROSPHERES AND PERFLUTREN 3 ML: 10; .22 INJECTION, SOLUTION INTRAVENOUS at 12:40

## 2023-01-01 RX ADMIN — DOCUSATE SODIUM 100 MG: 100 CAPSULE, LIQUID FILLED ORAL at 05:36

## 2023-01-01 ASSESSMENT — COGNITIVE AND FUNCTIONAL STATUS - GENERAL
MOBILITY SCORE: 16
EATING MEALS: A LITTLE
DRESSING REGULAR LOWER BODY CLOTHING: A LITTLE
SUGGESTED CMS G CODE MODIFIER MOBILITY: CK
DAILY ACTIVITIY SCORE: 22
STANDING UP FROM CHAIR USING ARMS: A LITTLE
MOBILITY SCORE: 19
SUGGESTED CMS G CODE MODIFIER DAILY ACTIVITY: CJ
PERSONAL GROOMING: A LITTLE
EATING MEALS: A LITTLE
WALKING IN HOSPITAL ROOM: A LITTLE
EATING MEALS: A LITTLE
TOILETING: A LITTLE
TOILETING: A LITTLE
CLIMB 3 TO 5 STEPS WITH RAILING: A LOT
PERSONAL GROOMING: A LITTLE
MOVING FROM LYING ON BACK TO SITTING ON SIDE OF FLAT BED: A LITTLE
HELP NEEDED FOR BATHING: A LITTLE
STANDING UP FROM CHAIR USING ARMS: A LOT
MOVING FROM LYING ON BACK TO SITTING ON SIDE OF FLAT BED: UNABLE
SUGGESTED CMS G CODE MODIFIER DAILY ACTIVITY: CK
MOVING TO AND FROM BED TO CHAIR: A LITTLE
DAILY ACTIVITIY SCORE: 18
CLIMB 3 TO 5 STEPS WITH RAILING: A LITTLE
MOVING TO AND FROM BED TO CHAIR: A LITTLE
WALKING IN HOSPITAL ROOM: TOTAL
SUGGESTED CMS G CODE MODIFIER DAILY ACTIVITY: CK
CLIMB 3 TO 5 STEPS WITH RAILING: TOTAL
MOVING TO AND FROM BED TO CHAIR: UNABLE
STANDING UP FROM CHAIR USING ARMS: TOTAL
DRESSING REGULAR UPPER BODY CLOTHING: A LITTLE
SUGGESTED CMS G CODE MODIFIER MOBILITY: CK
DRESSING REGULAR UPPER BODY CLOTHING: A LITTLE
MOBILITY SCORE: 9
HELP NEEDED FOR BATHING: A LITTLE
DAILY ACTIVITIY SCORE: 18
WALKING IN HOSPITAL ROOM: A LOT
HELP NEEDED FOR BATHING: A LITTLE
DRESSING REGULAR LOWER BODY CLOTHING: A LITTLE
SUGGESTED CMS G CODE MODIFIER MOBILITY: CM
MOVING FROM LYING ON BACK TO SITTING ON SIDE OF FLAT BED: A LITTLE

## 2023-01-01 ASSESSMENT — LIFESTYLE VARIABLES
TOTAL SCORE: 0
HOW MANY TIMES IN THE PAST YEAR HAVE YOU HAD 5 OR MORE DRINKS IN A DAY: 0
AVERAGE NUMBER OF DAYS PER WEEK YOU HAVE A DRINK CONTAINING ALCOHOL: 1
EVER FELT BAD OR GUILTY ABOUT YOUR DRINKING: NO
CONSUMPTION TOTAL: NEGATIVE
ON A TYPICAL DAY WHEN YOU DRINK ALCOHOL HOW MANY DRINKS DO YOU HAVE: 1
HAVE YOU EVER FELT YOU SHOULD CUT DOWN ON YOUR DRINKING: NO
EVER HAD A DRINK FIRST THING IN THE MORNING TO STEADY YOUR NERVES TO GET RID OF A HANGOVER: NO
DOES PATIENT WANT TO STOP DRINKING: NO
HAVE PEOPLE ANNOYED YOU BY CRITICIZING YOUR DRINKING: NO
ALCOHOL_USE: YES
TOTAL SCORE: 0
TOTAL SCORE: 0

## 2023-01-01 ASSESSMENT — PAIN DESCRIPTION - PAIN TYPE
TYPE: ACUTE PAIN
TYPE: ACUTE PAIN;SURGICAL PAIN
TYPE: ACUTE PAIN
TYPE: ACUTE PAIN;SURGICAL PAIN
TYPE: ACUTE PAIN
TYPE: ACUTE PAIN;SURGICAL PAIN

## 2023-01-01 ASSESSMENT — ENCOUNTER SYMPTOMS
COUGH: 0
SHORTNESS OF BREATH: 0
CHEST TIGHTNESS: 0
CHOKING: 0

## 2023-01-01 ASSESSMENT — GAIT ASSESSMENTS
GAIT LEVEL OF ASSIST: CONTACT GUARD ASSIST
DISTANCE (FEET): 150
ASSISTIVE DEVICE: HAND HELD ASSIST

## 2023-01-01 ASSESSMENT — ACTIVITIES OF DAILY LIVING (ADL): TOILETING: INDEPENDENT

## 2023-01-01 ASSESSMENT — PATIENT HEALTH QUESTIONNAIRE - PHQ9
SUM OF ALL RESPONSES TO PHQ9 QUESTIONS 1 AND 2: 0
2. FEELING DOWN, DEPRESSED, IRRITABLE, OR HOPELESS: NOT AT ALL
1. LITTLE INTEREST OR PLEASURE IN DOING THINGS: NOT AT ALL
CLINICAL INTERPRETATION OF PHQ2 SCORE: 0

## 2023-01-01 ASSESSMENT — PAIN SCALES - GENERAL: PAIN_LEVEL: 2

## 2023-02-27 PROBLEM — R47.01 COMBINED RECEPTIVE AND EXPRESSIVE APHASIA: Status: ACTIVE | Noted: 2023-01-01

## 2023-02-27 PROBLEM — C71.9 GLIOMA (HCC): Status: ACTIVE | Noted: 2023-01-01

## 2023-02-27 NOTE — LETTER
2023      RE: Wicho Hitchcock   1960    To Whom It May Concern,    Wicho Hitchcock is an established patient of [facility] at [facility].  Due to a recent and previously unforeseen medical issue, versus no longer able to live independently, and I have advised that he must vacate his place of residence in order to be attended to .  This condition is likely to occur indefinitely, and thus is not a temporary status of living.    Please do not hesitate to contact my office with any questions or concerns.    Sincerely,    MD Geovanny Araujo M.D.

## 2023-02-27 NOTE — PROGRESS NOTES
[facility] FAMILY MEDICINE OFFICE VISIT    Date: 2/27/2023    MRN: [id number]  Patient ID: Wicho Hitchcock    SUBJECTIVE:  Wicho Hitchcock is a 62 y.o. male here to establish care.  Patient attended to at this visit by his brother who provided most elements of HPI.  Matt (patient's brother) reports that in early February, Wicho was driving in [facility] when he suddenly became lost and confused.  Was seen at the emergency department there, and noted to have possible brain mass.  Patient was transferred to [place] for further care, where it was determined that patient likely has glioblastoma.  Matt reports that Wicho has had both expressive and receptive aphasia, and is frequently confused.  As a result, patient's brother has now moved Wicho in with himself here in the [facility].  Patient was referred to neurosurgery here in [place], however was told that he would require referral from a primary care doctor in order for this to be appropriately assessed by patient's insurance.    Per brother and patient, Wicho was never previously established with a doctor.  Has generally been healthy, and thus has not sought a doctor out for many years.    Family aware of seriousness and the potential morbidity of glioblastoma.  Are interested in discussing possibility of interventions further with both neurosurgery and heme-onc, however if advised not to pursue aggressive treatment, would ultimately also be okay with possible palliative type measures.    PMHx/PSHx:  History reviewed. No pertinent past medical history.  History reviewed. No pertinent surgical history.    Allergies: Patient has no allergy information on record.    Social history: Living with brother.  Not , no children.  .  Former tobacco and cannabis use, occasional alcohol.    Family history: No major family history of cancers.    OBJECTIVE:  Vitals:    02/27/23 1023   BP: 122/74   Pulse: 76   Resp: 18   Temp: 36.8 °C (98.2 °F)   SpO2: 94%  "    Vitals:    02/27/23 1023   BP: 122/74   Weight: 83.9 kg (185 lb)   Height: 1.88 m (6' 2\")       Physical Examination:  General: Well appearing male in no acute distress, resting on arrival to room  HEENT: Normocephalic, atraumatic, EOMI  Cardiovascular: RRR, no murmurs, gallops, or rubs  Pulmonary: CTAB, symmetrical chest expansion, no rales, rhonchi, or wheezes  Extremities: Moves all spontaneously, normal gait  Neurological: Alert, oriented to place and person, not oriented to event or time, eyes PERRLA, unable to assess further neurological examination secondary to patient confusion during questioning, frequent tangential speech and confusion    ASSESSMENT & PLAN:  Wicho Hitchcock is a 62 y.o. male here to establish care, with concerns as addressed below.    1. Glioma (HCC)  Referral to Neurosurgery    Referral to Hematology Oncology    Referral to Home Health    Referral to Palliative Care    CBC WITH DIFFERENTIAL    Basic Metabolic Panel    dexamethasone (DECADRON) 4 MG Tab    levETIRAcetam (KEPPRA) 500 MG Tab      2. Combined receptive and expressive aphasia  Referral to Home Health    Referral to Palliative Care          Orders Placed This Encounter    CBC WITH DIFFERENTIAL    Basic Metabolic Panel    Referral to Neurosurgery    Referral to Hematology Oncology    Referral to Home Health    Referral to Palliative Care    DISCONTD: dexamethasone (DECADRON) 4 MG Tab    DISCONTD: levETIRAcetam (KEPPRA) 500 MG Tab    dexamethasone (DECADRON) 4 MG Tab    levETIRAcetam (KEPPRA) 500 MG Tab       #Glioma  Physician reviewed records from outside hospital systems, demonstrating intracranial mass concerning for glioma.  Patient was started on medication at outside hospitals including dexamethasone 4 mg 4 times daily and Keppra 500 mg oral twice daily.  Physician refilled both of those medications for the time being.  Have ordered CBC and basic metabolic panel secondary to chronic medication use.  Advised family that " physician will contact them with lab results within 1 week of having a test performed, and to contact the office if they do not hear back on results during that time.  At this time physician has also placed referral to neurosurgery, hematology/oncology, home health, and palliative care to begin care for this condition.  Referrals process discussed.  Advised family to contact physician if they are told that it will be several months before they can be seen by neurosurgery or hematology oncology, as this would be unacceptable.  Advised family to schedule general follow-up visit in 2 weeks, during which time we will ensure that all referrals were received properly.  Attempted to perform POLST form, however patient determined to be incapable at present of providing consent.  Discussed necessity of seeking out legal action to determine medical power of .  Family already working on this measure.  We will plan to follow-up in 2 weeks.    #Combined receptive and expressive aphasia  Patient with both of these conditions secondary to glioma.  At this time referred to home health and palliative care for further assessments and recommendations.    Geovanny Piper M.D.  Family Medicine Resident  PGY-4

## 2023-02-28 NOTE — TELEPHONE ENCOUNTER
Spoke with Matt.  He will call to schedule in-home palliative medicine visit after initial neurology appointment.

## 2023-03-14 NOTE — OR NURSING
I spoke with pt's brother Matt. Pt is unable to speak due to a brain mass. Brother Matt  has some paper work to prove that he is the primary care giver for his brother. He was able to provide all info need it for this interview. Aware of the NPO status need it for tomorrow's procedure.

## 2023-03-15 PROBLEM — D72.829 LEUKOCYTOSIS: Status: ACTIVE | Noted: 2023-01-01

## 2023-03-15 PROBLEM — I15.9 SECONDARY HYPERTENSION: Status: ACTIVE | Noted: 2023-01-01

## 2023-03-15 NOTE — OR NURSING
1357 Pt arrived to PACU with Anesthesiologist and OR RN. Pt drowsy post surgery. Even, unlabored respirations. VSS. Pt has no signs pain. Pt has no signs nausea. Left head dressing CDI.     1415 MD Li at bedside to assess pt.     1430 POC update given to Matt, spouse over the phone. All questions answered.     1510 Pt meets PACU criteria. Pt denies nausea. Pt nodes that pain has improved with medication. Report called to JENNIFER Rico. Pt transported to R101 with RN. Chart, monitor, and belongings with patient.

## 2023-03-15 NOTE — OP REPORT
NEUROSURGERY OPERATIVE NOTE    3/15/2023 1400  Wicho Hitchcock [id number]    PROCEDURE:    1.  Stereotactic left parietal brain biopsy, Stealth intraoperative navigation  2.  Auto guide robotic navigation ([place])    DIAGNOSIS:  Left parietal temporal peripherally enhancing brain lesion intraparenchymal    Surgeon:  Cory Li MD, PhD  Assistant:  JAZMIN Avelar    Anesthesia:  GETA    Indication: 62-year-old male with increasing confusion.  Imaging demonstrates approximate 4 cm infiltrating peripherally enhancing lesion.  There are satellite lesions as well.  Stereotactic biopsy is planned for tissue diagnosis.    Procedure:  The patient was identified in the holding area.  The surgical site was marked and consent obtained.  The patient was brought to the operating room and intubated by Anesthesia service.  2 gram Ancef was administered intravenously.  The patient was positioned supine on the operating room table; his head was secured to a Cui Rhiannon pin head bajwa, a large gel roll placed on the left shoulder and his head was positioned near parallel to the floor.  His head was registered to the Silent Circlealth intraoperative navigation system using the tracer methodology, good registration was verified.  The preplanned entry site was identified.    His left scalp was prepped with hair clipping, chlorhexidine scrub, Betadine scrub, and ChloraPrep.  The skin overlying the entry point was infiltrated 0.5% Marcaine with epinephrine.  The skin was incised sharply dissection carried deeply using monopolar cautery.  A self-retaining retractor was placed.  The auto guide robotic navigation system was brought in to the field sterilely, the entry point was localized and locked in position.  A small bur hole was created using the provided drill bit.  A biopsy needle was introduced to target within the left parietal peripherally enhancing mass.  Multiple specimens were obtained, including the force aspect of the  lesion, middle, and more superficial incorporating the peripheral border.  Frozen pathology demonstrated necrosis.  Permanent pathology is pending.  The biopsy needle was removed.  Good hemostasis was noted.  Surgiflo was placed in the bur hole.  The dermis was reapproximated using 3-0 Vicryl suture in interrupted inverted manner.  The skin was reapproximated using staples.  Sterile dressing was placed.  Final counts were correct.    EBL:  Minimal  Specimen: Left parietal intraparenchymal lesion, frozen and permanent pathology.  Findings: Successful stereotactic biopsy   drains:  none  Complication:  None apparent at end of procedure

## 2023-03-15 NOTE — ANESTHESIA POSTPROCEDURE EVALUATION
Patient: Wicho Hitchcock    Procedure Summary     Date: 03/15/23 Room / Location: [facility] OR 04 / SURGERY [facility]    Anesthesia Start: 1209 Anesthesia Stop: 1409    Procedure: LEFT TEMPORAL PARIETAL BRAIN BIOPSY WITH ROBOTIC ASSISTANCE (Left: Head) Diagnosis: (NEOPLASM OF BRAIN)    Surgeons: Cory Li M.D. Responsible Provider: Yannick Gentile M.D.    Anesthesia Type: general ASA Status: 3          Final Anesthesia Type: general  Last vitals  BP   Blood Pressure: 128/87    Temp   35.9 °C (96.6 °F)    Pulse   73   Resp   18    SpO2   94 %      Anesthesia Post Evaluation    Patient location during evaluation: PACU  Patient participation: complete - patient participated  Level of consciousness: awake and alert  Pain score: 2    Airway patency: patent  Anesthetic complications: no  Cardiovascular status: hemodynamically stable  Respiratory status: acceptable  Hydration status: euvolemic    PONV: none          There were no known notable events for this encounter.     Nurse Pain Score: 0 (NPRS)

## 2023-03-15 NOTE — CONSULTS
Critical Care Consultation    Date of consult: 3/15/2023    Referring Physician  Cory Li M.D.    Reason for Consultation  Brain mass    History of Presenting Illness  62 y.o. male who presented 3/15/2023 with a history of cardiac disease and recently developing and speech difficulty confusion for which work-up found a 4 cm peripherally enhancing brain mass with satellite lesions.  He was referred to neurosurgery for stereotactic left parietal brain biopsy which he underwent today without complications.    Code Status  Full Code    Review of Systems  Review of Systems   Unable to perform ROS: Mental status change     Past Medical History   has a past medical history of Cancer (HCC) (02/04/2023) and Myocardial infarct (HCC).    Surgical History   has no past surgical history on file. - none    Family History  family history is not on file. - none    Social History   reports that he has never smoked. He has never used smokeless tobacco. He reports that he does not currently use alcohol. He reports that he does not currently use drugs.    Medications  Home Medications       Reviewed by Tierra Faulkner R.N. (Registered Nurse) on 03/15/23 at 1230  Med List Status: Complete     Medication Last Dose Status   dexamethasone (DECADRON) 4 MG Tab 3/14/2023 Active   fentaNYL (SUBLIMAZE) injection  Active   levETIRAcetam (KEPPRA) 500 MG Tab 3/14/2023 Active   lidocaine (XYLOCAINE) 1 % injection 0.5 mL  Active                  Current Facility-Administered Medications   Medication Dose Route Frequency Provider Last Rate Last Admin    NS infusion   Intravenous Continuous Yannick Gentile M.D. 125 mL/hr at 03/15/23 1603 New Bag at 03/15/23 1603    Pharmacy Consult Request ...Pain Management Review 1 Each  1 Each Other PHARMACY TO DOSE VILLA Sorto MD ALERT...DO NOT ADMINISTER NSAIDS or ASPIRIN unless ORDERED By Neurosurgery 1 Each  1 Each Other PRN VILLA Sorto        ondansetron  (ZOFRAN) syringe/vial injection 4 mg  4 mg Intravenous Q4HRS PRN ISHMAEL Sorto.P.R.N.        labetalol (NORMODYNE/TRANDATE) injection 10 mg  10 mg Intravenous Q HOUR PRN ISHMAEL Sorto.P.R.N.        cloNIDine (CATAPRES) tablet 0.1 mg  0.1 mg Oral Q4HRS PRN ISHMAEL Sorto.P.R.N.        docusate sodium (COLACE) capsule 100 mg  100 mg Oral BID Samantha Turner A.P.R.N.   100 mg at 03/15/23 1718    senna-docusate (PERICOLACE or SENOKOT S) 8.6-50 MG per tablet 1 Tablet  1 Tablet Oral Nightly ISHMAEL Sorto.P.R.N.        senna-docusate (PERICOLACE or SENOKOT S) 8.6-50 MG per tablet 1 Tablet  1 Tablet Oral Q24HRS PRN ISHMAEL Sorto.P.R.N.        polyethylene glycol/lytes (MIRALAX) PACKET 1 Packet  1 Packet Oral BID PRN ISHMAEL Sorto.P.R.N.        magnesium hydroxide (MILK OF MAGNESIA) suspension 30 mL  30 mL Oral QDAY PRN ISHMAEL Sorto.P.R.N.        bisacodyl (DULCOLAX) suppository 10 mg  10 mg Rectal Q24HRS PRN ISHMAEL Sorto.P.R.N.        sodium phosphate (Fleet) enema 133 mL  1 Each Rectal Once PRN Samantha Turner A.P.R.N.        artificial tears (EYE LUBRICANT) ophth ointment 1 Application.  1 Application. Both Eyes Q8HRS ISHMAEL Sorto.P.R.N.        acetaminophen (TYLENOL) tablet 1,000 mg  1,000 mg Oral Q6HRS Samantha Turner, A.P.R.N.   1,000 mg at 03/15/23 1717    Followed by    [START ON 3/20/2023] acetaminophen (TYLENOL) tablet 1,000 mg  1,000 mg Oral Q6HRS PRN JOSE MARTIN SortoP.R.N.        oxyCODONE immediate-release (ROXICODONE) tablet 5 mg  5 mg Oral Q3HRS PRN ISHMAEL Sorto.P.R.N.        Or    oxyCODONE immediate release (ROXICODONE) tablet 10 mg  10 mg Oral Q3HRS PRN ISHMAEL Sorto.P.R.N.        Or    morphine 4 MG/ML injection 2-4 mg  2-4 mg Intravenous Q3HRS PRN ISHMAEL Sorto.P.R.N.        ceFAZolin (Ancef) 2 g in  mL IVPB  2 g Intravenous Q8HR ISHMAEL Sorto.P.R.N.        benzocaine-menthol (Cepacol) lozenge  1 Lozenge  1 Lozenge Mouth/Throat Q2HRS PRN Samantha Turner, A.P.R.N.        famotidine (PEPCID) tablet 20 mg  20 mg Oral BID Samantha Turner, A.P.R.N.   20 mg at 03/15/23 1717    Or    famotidine (PEPCID) injection 20 mg  20 mg Intravenous BID Samantha Turner, A.P.R.N.        diphenhydrAMINE (BENADRYL) tablet/capsule 25 mg  25 mg Oral Q6HRS PRN Samantha Turner, A.P.R.N.        Or    diphenhydrAMINE (BENADRYL) injection 25 mg  25 mg Intravenous Q6HRS PRN Samantha Turner, A.P.R.N.        dexamethasone (DECADRON) tablet 6 mg  6 mg Oral Q8HRS Samantha Turner, A.P.R.N.   6 mg at 03/15/23 1718    levETIRAcetam (KEPPRA) tablet 500 mg  500 mg Oral BID Samantha Turner, A.P.R.N.   500 mg at 03/15/23 1717       Allergies  No Known Allergies    Vital Signs last 24 hours  Temp:  [35.6 °C (96 °F)-36.7 °C (98 °F)] 35.8 °C (96.5 °F)  Pulse:  [60-79] 74  Resp:  [13-20] 18  BP: (122-153)/(70-90) 153/87  SpO2:  [94 %-100 %] 97 %    Physical Exam  Physical Exam  Vitals and nursing note reviewed.   Constitutional:       General: He is awake. He is not in acute distress.     Appearance: He is well-developed. He is not toxic-appearing.   HENT:      Head: Normocephalic and atraumatic.      Comments: Surgical incision site is clean/dry/intact     Nose: Nose normal.      Mouth/Throat:      Mouth: Mucous membranes are moist.      Pharynx: Oropharynx is clear. No oropharyngeal exudate.   Eyes:      General: No scleral icterus.     Extraocular Movements: Extraocular movements intact.      Conjunctiva/sclera: Conjunctivae normal.      Pupils: Pupils are equal, round, and reactive to light.   Neck:      Vascular: No JVD.   Cardiovascular:      Rate and Rhythm: Normal rate and regular rhythm.      Pulses: Normal pulses.      Heart sounds: Normal heart sounds. No murmur heard.  Pulmonary:      Effort: Pulmonary effort is normal. No respiratory distress.      Breath sounds: Normal breath sounds. No stridor. No wheezing.   Abdominal:       General: Bowel sounds are normal. There is no distension.      Palpations: Abdomen is soft.      Tenderness: There is no abdominal tenderness. There is no guarding or rebound.   Musculoskeletal:         General: No tenderness.      Cervical back: Neck supple. No tenderness.      Right lower leg: No edema.      Left lower leg: No edema.      Comments: Radial arterial catheter in place   Skin:     General: Skin is warm and dry.      Capillary Refill: Capillary refill takes less than 2 seconds.      Coloration: Skin is not pale.   Neurological:      General: No focal deficit present.      Mental Status: He is alert.      Cranial Nerves: No cranial nerve deficit.      Sensory: No sensory deficit.      Motor: No weakness.      Comments: Strong throughout and follows commands with some communication difficulties, continues to attempt to communicate regarding the next steps in his plan   Psychiatric:         Behavior: Behavior is cooperative.      Comments: Unable to assess given current clinical condition       Fluids    Intake/Output Summary (Last 24 hours) at 3/15/2023 1807  Last data filed at 3/15/2023 1600  Gross per 24 hour   Intake 1520 ml   Output 510 ml   Net 1010 ml       Laboratory  Recent Results (from the past 48 hour(s))   Basic Metabolic Panel    Collection Time: 03/15/23  8:38 AM   Result Value Ref Range    Sodium 139 135 - 145 mmol/L    Potassium 4.2 3.6 - 5.5 mmol/L    Chloride 107 96 - 112 mmol/L    Co2 22 20 - 33 mmol/L    Glucose 92 65 - 99 mg/dL    Bun 33 (H) 8 - 22 mg/dL    Creatinine 1.07 0.50 - 1.40 mg/dL    Calcium 8.7 8.5 - 10.5 mg/dL    Anion Gap 10.0 7.0 - 16.0   CBC Without Differential    Collection Time: 03/15/23  8:38 AM   Result Value Ref Range    WBC 13.3 (H) 4.8 - 10.8 K/uL    RBC 4.76 4.70 - 6.10 M/uL    Hemoglobin 15.4 14.0 - 18.0 g/dL    Hematocrit 45.2 42.0 - 52.0 %    MCV 95.0 81.4 - 97.8 fL    MCH 32.4 27.0 - 33.0 pg    MCHC 34.1 33.7 - 35.3 g/dL    RDW 47.1 35.9 - 50.0 fL     Platelet Count 200 164 - 446 K/uL    MPV 9.1 9.0 - 12.9 fL   COD (Adult)    Collection Time: 03/15/23  8:38 AM   Result Value Ref Range    ABO Grouping Only A     Rh Grouping Only POS     Antibody Screen-Cod NEG    ESTIMATED GFR    Collection Time: 03/15/23  8:38 AM   Result Value Ref Range    GFR (CKD-EPI) 77 >60 mL/min/1.73 m 2   ECG    Collection Time: 03/15/23  9:08 AM   Result Value Ref Range    Report       [facility]    Test Date:  2023-03-15  Pt Name:    MJ VERDIN                  Department: [facility]  MRN:        [id number]                      Room:       [facility]  Gender:     Male                         Technician: FGJ  :        1960                   Requested By:ELISEO CONTRERAS  Order #:    [id number]                    Reading MD: Harjeet Lezama MD    Measurements  Intervals                                Axis  Rate:       65                           P:          42  NY:         162                          QRS:        -14  QRSD:       115                          T:  QT:         440  QTc:        458    Interpretive Statements  SINUS RHYTHM  NONSPECIFIC INTRAVENTRICULAR CONDUCTION DELAY  ABNORMAL INFERIOR Q WAVES  ABNORMAL T, CONSIDER ISCHEMIA, DIFFUSE LEADS  MINIMAL ST ELEVATION, ANTERIOR LEADS  No previous ECG available for comparison  Electronically Signed On 3- 9:26:42 PDT by Harjeet Lezama MD     Prothrombin Time    Collection Time: 03/15/23 10:45 AM   Result Value Ref Range    PT 13.4 12.0 - 14.6 sec    INR 1.03 0.87 - 1.13   APTT    Collection Time: 03/15/23 10:45 AM   Result Value Ref Range    APTT <20.0 (L) 24.7 - 36.0 sec   ABO Rh Confirm    Collection Time: 03/15/23 10:45 AM   Result Value Ref Range    ABO Rh Confirm A POS    Histology Request    Collection Time: 03/15/23  1:24 PM   Result Value Ref Range    Pathology Request Sent to Histo        Imaging  [facility] BRAIN WITH & W/O   Final Result         Enhancing, centrally necrotic posterior temporal and  thalamic infiltrating mass concerning for high-grade glioma. Creeping enhancement along the ependymal margin of the left periatrial region noted. Additional areas of T2 infiltration and enhancement in    the left occipital white matter noted.      Mass effect upon the left lateral ventricle with midline shift to the right measuring 7 mm.             Assessment/Plan  Glioma (HCC)- (present on admission)  Assessment & Plan  Status post brain biopsy 3/15 with Dr. Li  Follow-up on pathology, oncology consultation once resulted  Continue Decadron, Keppra  Strict blood pressure goals with SBP less than 160  Close neurologic monitoring    Secondary hypertension  Assessment & Plan  As needed labetalol and hydralazine for goal SBP less than 160  Nicardipine drip if the above are not sufficient    Combined receptive and expressive aphasia- (present on admission)  Assessment & Plan  Secondary to brain tumor  PT/OT/SLP    Leukocytosis  Assessment & Plan  Likely due to steroids, no signs of infection  Monitor off antibiotics for now        Discussed patient condition and risk of morbidity and/or mortality with RN, Charge nurse / hot rounds, Patient, and neurosurgery.    The patient remains critically ill.  Critical care time = 32 minutes in directly providing and coordinating critical care and extensive data review.  No time overlap and excludes procedures.    Please note that this dictation was created using voice recognition software. I have made every reasonable attempt to correct obvious errors, but there may be errors of grammar and possibly content that I did not discover before finalizing the note.

## 2023-03-15 NOTE — ANESTHESIA PROCEDURE NOTES
Airway    Date/Time: 3/15/2023 12:22 PM  Performed by: Yannick Gentile M.D.  Authorized by: Yannick Gentile M.D.     Location:  OR  Urgency:  Elective  Difficult Airway: No    Indications for Airway Management:  Anesthesia      Spontaneous Ventilation: absent    Sedation Level:  Deep  Preoxygenated: Yes    Patient Position:  Sniffing  Mask Difficulty Assessment:  1 - vent by mask  Final Airway Type:  Endotracheal airway  Final Endotracheal Airway:  ETT  Cuffed: Yes    Technique Used for Successful ETT Placement:  Direct laryngoscopy    Insertion Site:  Oral  Blade Type:  Lara  Laryngoscope Blade/Videolaryngoscope Blade Size:  2  ETT Size (mm):  8.0  Measured from:  Teeth  ETT to Teeth (cm):  23  Placement Verified by: auscultation and capnometry    Cormack-Lehane Classification:  Grade I - full view of glottis  Number of Attempts at Approach:  1

## 2023-03-15 NOTE — ANESTHESIA PREPROCEDURE EVALUATION
Case: [id number] Date/Time: 03/15/23 1115    Procedure: LEFT TEMPORAL PARIETAL BRAIN BIOPSY WITH ROBOTIC ASSISTANCE (Head)    Pre-op diagnosis: NEOPLASM OF BRAIN    Location: [facility] OR 04 / SURGERY [facility]    Surgeons: Cory Li M.D.          Relevant Problems   No relevant active problems       Physical Exam    Airway   Mallampati: III  TM distance: >3 FB  Neck ROM: full       Cardiovascular - normal exam  Rhythm: regular  Rate: normal  (-) murmur     Dental - normal exam           Pulmonary - normal exam  Breath sounds clear to auscultation     Abdominal    Neurological - normal exam                 Anesthesia Plan    ASA 3   ASA physical status 3 criteria: CVA or TIA - history (> 3 months)    Plan - general       Airway plan will be ETT    (I plan to use an a-line.)      Induction: intravenous    Postoperative Plan: Postoperative administration of opioids is intended.    Pertinent diagnostic labs and testing reviewed    Informed Consent:    Anesthetic plan and risks discussed with patient.    Use of blood products discussed with: patient whom consented to blood products.

## 2023-03-15 NOTE — PROGRESS NOTES
Med rec complete per pt's family at desk.  Interviewed pt with family at desk with permission from pt  Allergies reviewed and updated.

## 2023-03-15 NOTE — ANESTHESIA PROCEDURE NOTES
Arterial Line  Performed by: Yannick Gentile M.D.  Authorized by: Yannick Gentile M.D.     End Time:  3/15/2023 12:31 PM  Localization: surface landmarks    Patient Location:  OR  Indication: continuous blood pressure monitoring        Catheter Size:  20 G  Seldinger Technique?: Yes    Laterality:  Left  Site:  Radial artery  Line Secured:  Antimicrobial disc, tape and transparent dressing  Events: patient tolerated procedure well with no complications

## 2023-03-16 PROBLEM — I47.20 V TACH (HCC): Status: ACTIVE | Noted: 2023-01-01

## 2023-03-16 NOTE — ASSESSMENT & PLAN NOTE
Status post brain biopsy 3/15 with Dr. Li - pathology revealing high grade glioma, stage IV    Awaiting further NSGY recommendations given new pathology findings    Continue Decadron, Keppra  Strict blood pressure goals with SBP less than 160  Close neurologic monitoring

## 2023-03-16 NOTE — ASSESSMENT & PLAN NOTE
NSVT noted overnight, unable to assess symptoms due to aphasia.   - Will monitor electrolytes closes and optimize, goal K>4, Mg>2  - Will order repeat EKG  - Echo ordered and pending  - Cardiology consulted, appreciate recs

## 2023-03-16 NOTE — PROGRESS NOTES
APRN notified and showed in person pts monitor strip with run of V tach. No new orders, awaiting pts electrolyte lab results.

## 2023-03-16 NOTE — THERAPY
Speech Language Pathology   Clinical Swallow Evaluation     Patient Name: Wicho Hitchcock  AGE:  62 y.o., SEX:  male  Medical Record #: [id number]  Date of Service: 3/16/2023      History of Present Illness  62 y.o. man admitted on 3/15/23 for planned stereotactic L parietal brain biopsy (completed 3/15) (biopsy pending). Patient recently developed language difficulty and confusion, found to have brain mass. PMH notable for cardiac disease.     Imaging:  3/15/23 MR-Ste[facility] Brain: Enhancing, centrally necrotic posterior temporal and thalamic infiltrating mass concerning for high-grade glioma. Creeping enhancement along the ependymal margin of the left periatrial region noted. Additional areas of T2 infiltration and enhancement in the left occipital white matter noted. Mass effect upon the left lateral ventricle with midline shift to the right measuring 7 mm.      General Information:  Vitals  O2 Delivery Device: None - Room Air  Level of Consciousness: Alert  Patient Behaviors:  (Appropriate, calm)  Orientation: Disoriented x 4 (suspect r/t aphasia)  Follows Directives: Inconsistent      Prior Living Situation & Level of Function:  Lives with - Patient's Self Care Capacity: Sibling  Comments: Patient moved in with brother, Matt, at onset of symptoms. Brother assists with IADLs.  Communication: Impaired, receptive and expressive  Swallowing: WFL       Oral Mechanism Evaluation:  Dentition: Good, Natural dentition   Facial Symmetry: Equal  Facial Sensation: Pt did not follow commands to assess     Labial Observations: WFL   Lingual Observations: Pt did not follow commands to assess  Motor Speech: WFL          Laryngeal Function:  Secretion Management: Adequate  Voice Quality: WFL  Cough: Pt did not follow commands to assess       Subjective  Patient perseverative on  with incorrect year (11/21/15). Patient poor historian r/t aphasia. Patient's brother, Matt, at bedside is an excellent historian. Patient has excellent  family support. Patient's brother indicated gestures and potentially objects/pictures are more successful modes of communication.      Assessment  Current Method of Nutrition: NPO until cleared by speech pathology  Positioning: Rogers's (60-90 degrees)  Bolus Administration: Patient    O2 Delivery Device: None - Room Air  Factor(s) Affecting Performance: Impaired command following  Tracheostomy : No       Swallowing Trials:  Thin Liquid (TN0): WFL  Liquidised (LQ3): WFL  Regular (RG7): WFL      Comments: Adequate oral bolus acceptance/containment, complete AP transfer, and timely mastication. No cough/throat clear appreciated with PO. Vocal quality remained stable throughout assessment. Single swallow completed per bolus. Patient adequately self-feeding with appropriate rate and volume of intake. Discussed pill intake with brother, patient typically takes pills whole with water. Discussed crushing pills in applesauce should pocketing occur with RN. Provided education regarding general aspiration precautions as well as signs of aspiration. All questions addressed.       Clinical Impressions  Patient presents with clinically functional oropharyngeal swallow at this time. Service will follow to assess tolerance in the setting of evolving medical condition/treatment plan.     Recommendations  Diet Consistency: Regular solids with thin liquids  Instrumentation: None indicated at this time  Medication: Whole with liquid, Crush with applesauce, as appropriate, As tolerated  Supervision: Assist with meal tray set up, Distant supervision - check on patient 2-3 times per meal  Positioning: Fully upright and midline during oral intake  Risk Management : None  Oral Care: BID         SLP Treatment Plan  Treatment Plan: Dysphagia Treatment  SLP Frequency: 3x Per Week  Estimated Duration: Until Therapy Goals Met      Anticipated Discharge Needs  Discharge Recommendations: Anticipate that the patient will have no further speech  "therapy needs after discharge from the hospital   Therapy Recommendations Upon DC: Not Indicated (for dysphagia; may benefit from OP/HH language therapy)        Patient / Family Goals  Patient / Family Goal #1: \"He swallows totally fine\"  Short Term Goals  Short Term Goal # 1: Patient will consume a diet of RG7.TN0 without overt indicators of aspiration or decline in pulmonary status.      Madie Madrid, SLP   "

## 2023-03-16 NOTE — PROGRESS NOTES
"[facility] ICU Progress Note      Admit Date: 3/15/2023    Resident(s): Marcin Salazar M.D.   Attending:  RANJIT MATTHEWS/ Dr. Ceballos    Patient ID:    Name:  Wicho Hitchcock     YOB: 1960  Age:  62 y.o.  male   MRN:  [id number]    Hospital Course (carried forward and updated):    \"62 y.o. male who presented 3/15/2023 with a history of cardiac disease and recently developing and speech difficulty confusion for which work-up found a 4 cm peripherally enhancing brain mass with satellite lesions.  He was referred to neurosurgery for stereotactic left parietal brain biopsy which he underwent today without complications.\" - Dr. Gonda       Consultants:  Critical Care      Interval Events:  3/16: Patient w/ 1 episode of NSVT overnight, no significant changes in neuro status noted. Pathology report indicating high grade glioma, stage IV      Vitals Range last 24h:  Temp:  [35.6 °C (96 °F)-36.7 °C (98 °F)] 36.1 °C (97 °F)  Pulse:  [60-79] 76  Resp:  [7-50] 11  BP: (122-153)/(69-90) 129/69  SpO2:  [93 %-100 %] 96 %      Intake/Output Summary (Last 24 hours) at 3/16/2023 0616  Last data filed at 3/16/2023 0200  Gross per 24 hour   Intake 2692.92 ml   Output 1335 ml   Net 1357.92 ml        Review of Systems   Unable to perform ROS: Medical condition     PHYSICAL EXAM:  Vitals:    03/16/23 0000 03/16/23 0100 03/16/23 0200 03/16/23 0300   BP: (!) 144/89 (!) 142/87 129/75 129/69   Pulse: 69 69 61 76   Resp: (!) 26 (!) 10 (!) 7 (!) 11   Temp:       TempSrc:       SpO2: 94% 94% 94% 96%   Weight:       Height:        Body mass index is 23.68 kg/m².    O2 therapy: Pulse Oximetry: 96 %, O2 (LPM): 6, O2 Delivery Device: None - Room Air         Physical Exam  Constitutional:       General: He is not in acute distress.     Appearance: Normal appearance.   HENT:      Head:      Comments: Surgical site c/d/i  Cardiovascular:      Rate and Rhythm: Normal rate and regular rhythm.   Pulmonary:      Effort: Pulmonary effort is normal.      " Breath sounds: Normal breath sounds.   Abdominal:      General: Abdomen is flat.      Palpations: Abdomen is soft.   Skin:     General: Skin is warm and dry.      Capillary Refill: Capillary refill takes less than 2 seconds.   Neurological:      Mental Status: He is alert. He is disoriented.      Comments: Expressive and some receptive aphasia noted   Psychiatric:      Comments: Unable to assess due to aphasia           Recent Labs     03/15/23  0838   SODIUM 139   POTASSIUM 4.2   CHLORIDE 107   CO2 22   BUN 33*   CREATININE 1.07   CALCIUM 8.7     Recent Labs     03/15/23  0838   GLUCOSE 92     Recent Labs     03/15/23  0838 03/15/23  1045 03/16/23  0545   RBC 4.76  --  4.56*   HEMOGLOBIN 15.4  --  14.8   HEMATOCRIT 45.2  --  44.1   PLATELETCT 200  --  184   PROTHROMBTM  --  13.4  --    APTT  --  <20.0*  --    INR  --  1.03  --      Recent Labs     03/15/23  0838 03/16/23  0545   WBC 13.3* 14.7*       Meds:   NS   125 mL/hr at 03/15/23 1603    Pharmacy Consult Request  1 Each      MD ALERT...DO NOT ADMINISTER NSAIDS or ASPIRIN unless ORDERED By Neurosurgery  1 Each      ondansetron  4 mg      labetalol  10 mg      cloNIDine  0.1 mg      docusate sodium  100 mg      senna-docusate  1 Tablet      senna-docusate  1 Tablet      polyethylene glycol/lytes  1 Packet      magnesium hydroxide  30 mL      bisacodyl  10 mg      sodium phosphate  1 Each      artificial tears  1 Application.      acetaminophen  1,000 mg      Followed by    [START ON 3/20/2023] acetaminophen  1,000 mg      oxyCODONE immediate-release  5 mg      Or    oxyCODONE immediate-release  10 mg      Or    morphine injection  2-4 mg      benzocaine-menthol  1 Lozenge      famotidine  20 mg      Or    famotidine  20 mg      diphenhydrAMINE  25 mg      Or    diphenhydrAMINE  25 mg      dexamethasone  6 mg      levETIRAcetam  500 mg          Procedures:  3/15: Stereotactic left parietal brain biopsy, Stealth intraoperative navigation    Imaging:  MR-STEALTH  BRAIN WITH & W/O   Final Result         Enhancing, centrally necrotic posterior temporal and thalamic infiltrating mass concerning for high-grade glioma. Creeping enhancement along the ependymal margin of the left periatrial region noted. Additional areas of T2 infiltration and enhancement in    the left occipital white matter noted.      Mass effect upon the left lateral ventricle with midline shift to the right measuring 7 mm.             ASSESSEMENT and PLAN:    * Glioma (HCC)- (present on admission)  Assessment & Plan  Status post brain biopsy 3/15 with Dr. Li - pathology revealing high grade glioma, stage IV    Awaiting further NSGY recommendations given new pathology findings    Continue Decadron, Keppra  Strict blood pressure goals with SBP less than 160  Close neurologic monitoring      V tach  Assessment & Plan  NSVT noted overnight, unable to assess symptoms due to aphasia.   - Will monitor electrolytes closes and optimize, goal K>4, Mg>2  - Will order repeat EKG  - Echo ordered and pending  - Cardiology consulted, appreciate recs    Leukocytosis  Assessment & Plan  Likely due to steroids, no signs of infection  Monitor off antibiotics for now    Secondary hypertension  Assessment & Plan  As needed labetalol and hydralazine for goal SBP less than 160  Nicardipine drip if the above are not sufficient    Combined receptive and expressive aphasia- (present on admission)  Assessment & Plan  Secondary to brain tumor  PT/OT/SLP        DISPO: As per neurosurgery, no ICU needs at this time, patient will transfer to the floor. ICU to sign off.     CODE STATUS: FULL    Quality Measures:  Feeding: Regular  Analgesia: N/A  Sedation: N/A  Thromboprophylaxis: N/A  Head of bed: >30 degrees  Ulcer prophylaxis: Pepcid  Glycemic control: N/A  Bowel care: bowel regimen PRN  Indwelling lines: pIV  Deescalation of antibiotics: Cont post-op ancef as per NSGY      Marcin Salazar M.D.

## 2023-03-16 NOTE — CONSULTS
Reason for Consult:  Asked by Dr Cory Li M.D. to see this patient with  abnormal ekg    CC:     HPI:    62 year old man with brain mass s/p biopsy found glioma pending surgical resection. Consult for cardiac preoperative assessment. Found on telemetry short, asymptomatic NSVT. Patient without active cardiac complaints. Recalls being active prior without cardiovascular limitations. No toxic social habits. Interview somewhat limited by expressive aphasia.    Medications / Drug list prior to admission:  No current facility-administered medications on file prior to encounter.     Current Outpatient Medications on File Prior to Encounter   Medication Sig Dispense Refill    dexamethasone (DECADRON) 4 MG Tab Take 4 mg by mouth 3 times a day.      levETIRAcetam (KEPPRA) 500 MG Tab Take 1 Tablet by mouth 2 times a day. 180 Tablet 1       Current list of administered Medications:    Current Facility-Administered Medications:     artificial tears (EYE LUBRICANT) ophth ointment 1 Application., 1 Application., Both Eyes, PRN, Kenny Ceballos M.D.    dexamethasone (DECADRON) injection 10 mg, 10 mg, Intravenous, Q12HRS, ISHMAEL Sorto.P.R.N.    [START ON 3/17/2023] dexamethasone (DECADRON) tablet 6 mg, 6 mg, Oral, Q8HRS, JOSE MARTIN SortoP.R.N.    NS infusion, , Intravenous, Continuous, Yannick Gentile M.D., Last Rate: 125 mL/hr at 03/16/23 0922, New Bag at 03/16/23 0922    Pharmacy Consult Request ...Pain Management Review 1 Each, 1 Each, Other, PHARMACY TO DOSE, JOSE MARTIN SortoP.RMARYANN.    MD ALERT...DO NOT ADMINISTER NSAIDS or ASPIRIN unless ORDERED By Neurosurgery 1 Each, 1 Each, Other, PRN, JOSE MARTIN SortoP.R.N.    ondansetron (ZOFRAN) syringe/vial injection 4 mg, 4 mg, Intravenous, Q4HRS PRN, JOSE MARTIN SortoP.R.N.    labetalol (NORMODYNE/TRANDATE) injection 10 mg, 10 mg, Intravenous, Q HOUR PRN, JOSE MARTIN SortoP.R.N.    cloNIDine (CATAPRES) tablet 0.1 mg, 0.1 mg, Oral, Q4HRS PRN,  VILLA Sorto    docusate sodium (COLACE) capsule 100 mg, 100 mg, Oral, BID, Samantha Turner A.P.R.N., 100 mg at 03/16/23 0536    senna-docusate (PERICOLACE or SENOKOT S) 8.6-50 MG per tablet 1 Tablet, 1 Tablet, Oral, Nightly, GABRIELLA SortoR.N., 1 Tablet at 03/15/23 2235    senna-docusate (PERICOLACE or SENOKOT S) 8.6-50 MG per tablet 1 Tablet, 1 Tablet, Oral, Q24HRS PRN, VILLA Sorto    polyethylene glycol/lytes (MIRALAX) PACKET 1 Packet, 1 Packet, Oral, BID PRN, VILLA Sorto    magnesium hydroxide (MILK OF MAGNESIA) suspension 30 mL, 30 mL, Oral, QDAY PRN, JOSE MARTIN SortoPStanleyRStanleyNStanley    bisacodyl (DULCOLAX) suppository 10 mg, 10 mg, Rectal, Q24HRS PRN, GABRIELLA SortoRANTONIETA    sodium phosphate (Fleet) enema 133 mL, 1 Each, Rectal, Once PRN, JOSE MARTIN SortoPStanleyRANTONIETA    acetaminophen (TYLENOL) tablet 1,000 mg, 1,000 mg, Oral, Q6HRS, 1,000 mg at 03/16/23 1125 **FOLLOWED BY** [START ON 3/20/2023] acetaminophen (TYLENOL) tablet 1,000 mg, 1,000 mg, Oral, Q6HRS PRN, JOSE MARTIN SortoPStanleyRMARYANN.    oxyCODONE immediate-release (ROXICODONE) tablet 5 mg, 5 mg, Oral, Q3HRS PRN, 5 mg at 03/16/23 1010 **OR** oxyCODONE immediate release (ROXICODONE) tablet 10 mg, 10 mg, Oral, Q3HRS PRN **OR** morphine 4 MG/ML injection 2-4 mg, 2-4 mg, Intravenous, Q3HRS PRN, ISHMAEL Sorto.P.R.N., 2 mg at 03/16/23 1128    benzocaine-menthol (Cepacol) lozenge 1 Lozenge, 1 Lozenge, Mouth/Throat, Q2HRS PRN, JOSE MARTIN SortoP.R.N.    famotidine (PEPCID) tablet 20 mg, 20 mg, Oral, BID, 20 mg at 03/15/23 1717 **OR** [DISCONTINUED] famotidine (PEPCID) injection 20 mg, 20 mg, Intravenous, BID, ISHMAEL Sorto.P.R.N., 20 mg at 03/16/23 0537    diphenhydrAMINE (BENADRYL) tablet/capsule 25 mg, 25 mg, Oral, Q6HRS PRN **OR** diphenhydrAMINE (BENADRYL) injection 25 mg, 25 mg, Intravenous, Q6HRS PRN, ISHMAEL Sorto.P.R.N.    levETIRAcetam (KEPPRA) tablet 500 mg, 500 mg,  Oral, BID, Samantha Turner A.P.R.N., 500 mg at 03/16/23 0536    Past Medical History:   Diagnosis Date    Cancer (HCC) 02/04/2023    brain tumor    Myocardial infarct (HCC)        Past Surgical History:   Procedure Laterality Date    NH STEREOTACTIC BRAIN BX,ASPIR,EXC Left 3/15/2023    Procedure: LEFT TEMPORAL PARIETAL BRAIN BIOPSY WITH ROBOTIC ASSISTANCE;  Surgeon: Cory Li M.D.;  Location: SURGERY [facility];  Service: Neurosurgery       History reviewed. No pertinent family history.  Patient family history was personally reviewed, no pertinent family history to current presentation    Social History     Socioeconomic History    Marital status:      Spouse name: Not on file    Number of children: Not on file    Years of education: Not on file    Highest education level: Not on file   Occupational History    Not on file   Tobacco Use    Smoking status: Never    Smokeless tobacco: Never   Vaping Use    Vaping Use: Never used   Substance and Sexual Activity    Alcohol use: Not Currently    Drug use: Not Currently     Comment: Marijuana 7 months ago    Sexual activity: Not on file   Other Topics Concern    Not on file   Social History Narrative    Not on file     Social Determinants of Health     Financial Resource Strain: Not on file   Food Insecurity: Not on file   Transportation Needs: Not on file   Physical Activity: Not on file   Stress: Not on file   Social Connections: Not on file   Intimate Partner Violence: Not on file   Housing Stability: Not on file       ALLERGIES:  No Known Allergies    Review of systems:  A complete review of symptoms was reviewed with patient. This is reviewed in H&P and PMH. ALL OTHERS reviewed and negative    Physical exam:  Vitals:    03/16/23 1125 03/16/23 1128 03/16/23 1200 03/16/23 1300   BP: (!) 146/87  (!) 138/92 (!) 164/92   Pulse: 67 66 63 91   Resp: (!) 11 (!) 10 (!) 23 (!) 35   Temp:   36.2 °C (97.2 °F)    TempSrc:   Temporal    SpO2: 96% 96% 94% 96%    Weight:       Height:             General: No acute distress.   EYES: no jaundice  HEENT: OP clear   Neck: No bruits No JVD.   CVS:  RRR. S1 + S2. No M/R/G. No edema.  Resp: CTAB. No wheezing or crackles/rhonchi.  Abdomen: Soft, NT, ND,  Skin: Grossly nothing acute no obvious rashes  Neurological: Alert, Moves all extremities, no cranial nerve defects on limited exam  Extremities: Pulse 2+ in b/l LE. No cyanosis.     Data:  Laboratory studies personally reviewed by me:  Recent Results (from the past 24 hour(s))   Basic Metabolic Panel (BMP)    Collection Time: 23  5:45 AM   Result Value Ref Range    Sodium 135 135 - 145 mmol/L    Potassium 4.4 3.6 - 5.5 mmol/L    Chloride 104 96 - 112 mmol/L    Co2 23 20 - 33 mmol/L    Glucose 113 (H) 65 - 99 mg/dL    Bun 23 (H) 8 - 22 mg/dL    Creatinine 0.89 0.50 - 1.40 mg/dL    Calcium 8.2 (L) 8.5 - 10.5 mg/dL    Anion Gap 8.0 7.0 - 16.0   CBC without Differential    Collection Time: 23  5:45 AM   Result Value Ref Range    WBC 14.7 (H) 4.8 - 10.8 K/uL    RBC 4.56 (L) 4.70 - 6.10 M/uL    Hemoglobin 14.8 14.0 - 18.0 g/dL    Hematocrit 44.1 42.0 - 52.0 %    MCV 96.7 81.4 - 97.8 fL    MCH 32.5 27.0 - 33.0 pg    MCHC 33.6 (L) 33.7 - 35.3 g/dL    RDW 46.5 35.9 - 50.0 fL    Platelet Count 184 164 - 446 K/uL    MPV 9.2 9.0 - 12.9 fL   ESTIMATED GFR    Collection Time: 23  5:45 AM   Result Value Ref Range    GFR (CKD-EPI) 96 >60 mL/min/1.73 m 2   EKG    Collection Time: 23  1:54 PM   Result Value Ref Range    Report       [facility]    Test Date:  2023  Pt Name:    MJ VERDIN                  Department: [facility]  MRN:        [id number]                      Room:       [facility]  Gender:     Male                         Technician: CFR  :        1960                   Requested By:OMEGA AMBROCIO  Order #:    [id number]                    Reading MD:    Measurements  Intervals                                Axis  Rate:       64                            P:          36  FL:         158                          QRS:        -19  QRSD:       108                          T:          116  QT:         430  QTc:        444    Interpretive Statements  Sinus rhythm  Inferior infarct, old  Abnrm T, consider ischemia, anterolateral lds  Compared to ECG 03/15/2023 09:08:55  Myocardial infarct finding now present  Intraventricular conduction delay no longer present  Inferior Q waves no longer present  Q waves no longer present  T-wave abnormality no longer present  ST (T wave) deviation no longer present   Possible ischemia still present         EKG 3/16/23 sinus, inferior q waves, TWI - improved from 3/15/23    All pertinent features of laboratory and imaging reviewed including primary images where applicable      Principal Problem:    Glioma (HCC) POA: Yes  Active Problems:    Combined receptive and expressive aphasia POA: Yes    Secondary hypertension POA: Unknown    Leukocytosis POA: Unknown    V tach POA: Unknown  Resolved Problems:    * No resolved hospital problems. *      Assessment / Plan:  62 year old man with brain mass s/p biopsy found glioma pending surgical resection. Consult for cardiac preoperative assessment following short, asymptomatic NSVT.    -No active cardiac complaints. Prior METS >4. RCRI 0. EKG abnormal could be explained by neurological issues. Check TTE to ensure stable cardiac function.  -consider metoprolol tartrate 25mg bid.   -if echo stable and tolerates metoprolol no further recommendations.    I personally discussed his case with Dr Salazar    It is my pleasure to participate in the care of Mr. Hitchcock.  Please do not hesitate to contact me with questions or concerns.    Naveed Andrade MD  Cardiologist [facility]

## 2023-03-16 NOTE — ASSESSMENT & PLAN NOTE
As needed labetalol and hydralazine for goal SBP less than 160  Nicardipine drip if the above are not sufficient

## 2023-03-16 NOTE — DISCHARGE PLANNING
Case Management Discharge Planning    Admission Date: 3/15/2023  GMLOS: 1.8  ALOS: 1    6-Clicks ADL Score: 18  6-Clicks Mobility Score: 16  PT and/or OT Eval ordered: Yes  Post-acute Referrals Ordered: No, [facility] reviewed chart and indicates a PMR referral may be considered to assist with discharge planning.  Post-acute Choice Obtained: No  Has referral(s) been sent to post-acute provider:  No      Anticipated Discharge Dispo: Discharge Disposition: Disch to  rehab facility or distinct part unit (62)    DME Needed: Uncertain at this time.    Action(s) Taken: Updated Provider/Nurse on Discharge Plan    Escalations Completed: None    Medically Clear: No    Next Steps: Continue to monitor for changes and update discharge plan as appropriate. Follow-up with Attending and Bedside RN as needed.    Barriers to Discharge: Medical clearance and Pending PT Evaluation. Patient is POD#1 and pending further neurosurgery recommendations related to findings.    Is the patient up for discharge tomorrow: No

## 2023-03-16 NOTE — PROGRESS NOTES
Neurosurgery Progress Note    Subjective:  No acute events overnight.       Exam:  Awake, sitting up in bed. Expressive aphasia  3mm PERRL, EOMI.  No facial droop.   Hearing intact.   Requires some repeat direction for commands, follows visual directions well  Strength: Bilateral bicep, tricep, deltoid,  5/5.                   Bilateral IP, DF, PF 5/5.  Sensation: Intact throughout.   Incision: dressing c/d/i with staples intact. No redness, drainage, swelling.         BP  Min: 122/70  Max: 153/87  Pulse  Av.2  Min: 56  Max: 79  Resp  Av.1  Min: 6  Max: 50  Temp  Av.1 °C (97 °F)  Min: 35.6 °C (96 °F)  Max: 36.7 °C (98 °F)  SpO2  Av.7 %  Min: 93 %  Max: 100 %    No data recorded    Recent Labs     03/15/23  0838 23  0545   WBC 13.3* 14.7*   RBC 4.76 4.56*   HEMOGLOBIN 15.4 14.8   HEMATOCRIT 45.2 44.1   MCV 95.0 96.7   MCH 32.4 32.5   MCHC 34.1 33.6*   RDW 47.1 46.5   PLATELETCT 200 184   MPV 9.1 9.2     Recent Labs     03/15/23  0838 23  0545   SODIUM 139 135   POTASSIUM 4.2 4.4   CHLORIDE 107 104   CO2 22 23   GLUCOSE 92 113*   BUN 33* 23*   CREATININE 1.07 0.89   CALCIUM 8.7 8.2*     Recent Labs     03/15/23  1045   APTT <20.0*   INR 1.03           Intake/Output                         03/15/23 0700 - 23 0659 23 0700 - 23 0659     2243-7461 4476-9599 Total 2888-3951 2355-9879 Total                 Intake    P.O.  --  -- --  100  -- 100    P.O. -- -- -- 100 -- 100    I.V.  1762.2  1365.9 3128.1  709.9  -- 709.9    Propofol Volume 20 -- 20 -- -- --    Volume (mL) (NS infusion) 1500 -- 1500 -- -- --    Volume (mL) (NS infusion) 242.2 1365.9 1608.1 709.9 -- 709.9    Volume (mL) (lactated ringers infusion) 0 -- 0 -- -- --    IV Piggyback  0  -- 0  200  -- 200    Volume (mL) (ceFAZolin (Ancef) 2 g in  mL IVPB) 0 -- 0 200 -- 200    Total Intake 1762.2 1365.9 3128.1 1009.9 -- 1009.9       Output    Urine  600  1400 2000  0  -- 0    Output (mL) ([REMOVED] Urethral  Catheter Non-latex 16 Fr. 03/16/23 0600) 600 1400 2000 -- -- --    Output (mL) ([REMOVED] External Urinary Catheter 03/16/23 1245) -- -- -- 0 -- 0    Stool  --  -- --  --  -- --    Number of Times Stooled 0 x -- 0 x 0 x -- 0 x    Blood  10  -- 10  --  -- --    Est. Blood Loss 10 -- 10 -- -- --    Total Output 610 1400 2010 0 -- 0       Net I/O     1152.2 -34.1 1118.1 1009.9 -- 1009.9              Intake/Output Summary (Last 24 hours) at 3/16/2023 1316  Last data filed at 3/16/2023 1200  Gross per 24 hour   Intake 3137.94 ml   Output 2010 ml   Net 1127.94 ml             artificial tears  1 Application. PRN    NS   Continuous    Pharmacy Consult Request  1 Each PHARMACY TO DOSE    MD ALERT...DO NOT ADMINISTER NSAIDS or ASPIRIN unless ORDERED By Neurosurgery  1 Each PRN    ondansetron  4 mg Q4HRS PRN    labetalol  10 mg Q HOUR PRN    cloNIDine  0.1 mg Q4HRS PRN    docusate sodium  100 mg BID    senna-docusate  1 Tablet Nightly    senna-docusate  1 Tablet Q24HRS PRN    polyethylene glycol/lytes  1 Packet BID PRN    magnesium hydroxide  30 mL QDAY PRN    bisacodyl  10 mg Q24HRS PRN    sodium phosphate  1 Each Once PRN    acetaminophen  1,000 mg Q6HRS    Followed by    [START ON 3/20/2023] acetaminophen  1,000 mg Q6HRS PRN    oxyCODONE immediate-release  5 mg Q3HRS PRN    Or    oxyCODONE immediate-release  10 mg Q3HRS PRN    Or    morphine injection  2-4 mg Q3HRS PRN    benzocaine-menthol  1 Lozenge Q2HRS PRN    famotidine  20 mg BID    diphenhydrAMINE  25 mg Q6HRS PRN    Or    diphenhydrAMINE  25 mg Q6HRS PRN    dexamethasone  6 mg Q8HRS    levETIRAcetam  500 mg BID       Assessment and Plan:  Hospital day #1  POD #1  Prophylactic anticoagulation: no         Start date/time: TBD    Plan:  Stable neuro exam  Pt with expressive aphasia, same as prior to surgery, maybe slightly worse  Will increase a few doses of decadron. Was on 4mg Q6hr prior to surgery  PT/OT evals pending  Final Path: GBM  Discussed with Dr Li

## 2023-03-16 NOTE — PROGRESS NOTES
4 Eyes Skin Assessment Completed by Trisha RN and Hector RN.    Head Scab and Incision  Ears WDL  Nose WDL  Mouth WDL  Neck WDL  Breast/Chest WDL  Shoulder Blades WDL  Spine WDL  (R) Arm/Elbow/Hand WDL  (L) Arm/Elbow/Hand WDL  Abdomen WDL  Groin WDL  Scrotum/Coccyx/Buttocks WDL  (R) Leg WDL  (L) Leg WDL  (R) Heel/Foot/Toe WDL  (L) Heel/Foot/Toe WDL          Devices In Places ECG, Blood Pressure Cuff, Pulse Ox, Estes, SCD's, and Compression Dressing      Interventions In Place Pillows, Q2 Turns, Low Air Loss Mattress, and Heels Loaded W/Pillows    Possible Skin Injury No    Pictures Uploaded Into Epic N/A  Wound Consult Placed N/A  RN Wound Prevention Protocol Ordered Yes

## 2023-03-17 NOTE — PROGRESS NOTES
Cardiology Follow Up Progress Note    Date of Service  3/17/2023    Attending Physician  Cory Li M.D.    Chief Complaint     Asymptomatic NSVT, cardiology consult for preop assessment    HPI  Wicho Hitchcock is a 62 y.o. male with brain mass s/p biopsy found glioma pending surgical resection.      Interim Events    No overnight cardiac events  Telemetry-SR  Echocardiogram pending    Review of Systems  Review of Systems   Respiratory:  Negative for cough, choking, chest tightness and shortness of breath.      Vital signs in last 24 hours  Temp:  [36.2 °C (97.2 °F)-36.6 °C (97.9 °F)] 36.6 °C (97.9 °F)  Pulse:  [61-91] 72  Resp:  [9-35] 18  BP: (103-164)/() 129/82  SpO2:  [93 %-97 %] 94 %    Physical Exam  Physical Exam  Cardiovascular:      Rate and Rhythm: Normal rate and regular rhythm.      Pulses: Normal pulses.   Skin:     General: Skin is warm.   Neurological:      Mental Status: He is alert.       Lab Review  Lab Results   Component Value Date/Time    WBC 12.9 (H) 03/17/2023 12:15 AM    RBC 4.50 (L) 03/17/2023 12:15 AM    HEMOGLOBIN 14.5 03/17/2023 12:15 AM    HEMATOCRIT 42.4 03/17/2023 12:15 AM    MCV 94.2 03/17/2023 12:15 AM    MCH 32.2 03/17/2023 12:15 AM    MCHC 34.2 03/17/2023 12:15 AM    MPV 9.5 03/17/2023 12:15 AM      Lab Results   Component Value Date/Time    SODIUM 136 03/17/2023 12:15 AM    POTASSIUM 4.2 03/17/2023 12:15 AM    CHLORIDE 104 03/17/2023 12:15 AM    CO2 21 03/17/2023 12:15 AM    GLUCOSE 175 (H) 03/17/2023 12:15 AM    BUN 23 (H) 03/17/2023 12:15 AM    CREATININE 0.85 03/17/2023 12:15 AM      No results found for: ASTSGOT, ALTSGPT  No results found for: CHOLSTRLTOT, LDL, HDL, TRIGLYCERIDE, TROPONINT    No results for input(s): NTPROBNP in the last 72 hours.        Assessment/Plan    #Glioma 1  #Expressive aphasia  #Abnormal EKG    Recommendations  -Awaiting echo result to ensure stable cardiac function  -We will discuss with echo lab to expedite the process  -No rhythm issues  overnight      I personally spent a total of 15 minutes which includes face-to-face time and non-face-to-face time spent on preparing to see the patient, reviewing hospital notes and tests, obtaining history from the patient, performing a medically appropriate exam, counseling and educating the patient, ordering medications/tests/procedures/referrals as clinically indicated, and documenting information in the electronic medical record.     Thank you for allowing me to participate in the care of this patient.      Please contact me with any questions.    MAIKEL Talamantes.   Cardiologist, [facility]  [phone]

## 2023-03-17 NOTE — DOCUMENTATION QUERY
[facility]                                                                       Query Response Note      PATIENT:               MJ VERDIN  ACCT #:                  [id number]  MRN:                     [id number]  :                      1960  ADMIT DATE:       3/15/2023 7:32 AM  DISCH DATE:          RESPONDING  PROVIDER #:        [id number]           QUERY TEXT:    On 3/15 it was indicated in the Brain MRI that this patient has vasogenic edema, mass effect and midline shift. Please clarify whether the following has  been treated/evaluated during this hospitalization:    NOTE:  If an appropriate response is not listed below, please respond with a new note.    Thank you.    The patient's Clinical Indicators include:  3/15 MRI Brain: T2 hyperintense mass with vasogenic edema. Mass effect, midline shift to the right measuring 7 mm.  3/15 Critical Care Note: Glioma. Continue Decadron, Keppra    Risk factor: Glioma    Treatment: Critical care consult, Keppra, Decadron, nicardipine gtt    Thank you,  Kelly Basilio  Clinical   Connect via [employer]  Options provided:   -- Vasogenic cerebral edema   -- Vasogenic cerebral edema with brain compression   -- Other explanation, Please specify   -- Unable to determine      Query created by: Kelly Basilio on 3/16/2023 9:25 AM    RESPONSE TEXT:    Vasogenic cerebral edema with brain compression          Electronically signed by:  JEREMY M GONDA MD 3/16/2023 5:58 PM

## 2023-03-17 NOTE — THERAPY
"Speech Language Pathology   Communication Evaluation     Patient Name: Wicho Hitchcock  AGE:  62 y.o., SEX:  male  Medical Record #: [id number]  Date of Service: 3/17/2023      History of Present Illness  62 y.o. man admitted on 3/15/23 for planned stereotactic L parietal brain biopsy (completed 3/15) (biopsy pending). Patient recently developed language difficulty and confusion, found to have brain mass. PMH notable for cardiac disease.      Imaging:  3/15/23 MR-[facility] Brain: Enhancing, centrally necrotic posterior temporal and thalamic infiltrating mass concerning for high-grade glioma. Creeping enhancement along the ependymal margin of the left periatrial region noted. Additional areas of T2 infiltration and enhancement in the left occipital white matter noted. Mass effect upon the left lateral ventricle with midline shift to the right measuring 7 mm.      General Information  Vitals  O2 Delivery Device: None - Room Air  Level of Consciousness: Alert  Patient Behaviors: Anxious, Confused  Orientation:   Follows Directives: Inconsistent      Prior Living Situation & Level of Function  Prior Services: None, Home-Independent  Lives with - Patient's Self Care Capacity: Sibling  Comments: 6 weeks has been living with his brother and his wife; they have been providing 24/7 support for him; pt has been able to perform atuomatic tasks; no falls.  Communication: Impaired, receptive and expressive. Going on past few weeks.  Swallowing: WFL      Subjective  Pt initially agreeable but did fatigue and get frustrated - \"I already answered this many times and \"I'll do it tomorrow.\" Brother who is caregiver at this time provided additional information.       Communication Domain(s)  Expressive Language: Profound  Receptive Language: Severe  Reading: Severe      Assessment  The patient was seen this date for a aphasia evaluation.    Bedside Western Aphasia Battery (WAB)  Bedside Aphasia Score: 18.33  Bedside Aphasia Classification: " "Wernicke's (Severe)    Comments  Pt very perseverative which brother reported is typical presentation for him. This date perseverated on \"it's eleven in the morning\" and on \"There's three, the one in the middle maybe.\" Difficulty with Y/N even given multi-sensory communication options. Repeated said or spelled yes. Able to identity letters and numbers to read but did not demonstrate reading comprehension. Followed multi-step directives independently in 0/10 attempts but given direct, immediate model and tactile cues, pt was able to follow some simple directives. When asked to repeat, pt did benefit from direct model at first but then became perseverative and would imitate at the sound/syllable level but state his perseverative phrase with imitation of the length of utterance. Further evaluation of reading/writing not completed as pt was getting frustrated and asking to end the evaluation.     Clinical Impressions  Pt presents with severe aphasia most consistent with Wernicke's subtype per the WAB Bedside protocol. Given suspected dx and progressive decline per family, prognosis for recovery to baseline is guarded. However, pt would benefit from SLP service for aphasia in the acute and home health settings to increase communicative effectiveness and efficiency with his caregivers. Given pt able to identify letters and follow modeled movements, pt may be candidate for low-level AAC training for important communication targets.       NOTE: It is not within the scope of practice of Speech-Language Pathologists to determine patient capacity. Please defer to the physician or psych to complete this assessment.       Recommendations  Supervision Needs Upons Discharge: Direct assistance with IADLs (see below)  IADLs: Medication management, Financial management, Appointment management, Cooking, Household chores         SLP Treatment Plan  Treatment Plan: Dysphagia Treatment, Speech-Language Treatment, Patient/Family/Caregiver " "Training  SLP Frequency: 3x Per Week  Estimated Duration: Until Therapy Goals Met      Anticipated Discharge Needs  Discharge Recommendations: Recommend home health for continued speech therapy services  Therapy Recommendations Upon DC: Dysphagia Training, Patient / Family / Caregiver Education, Community Re-Integration (targeting increasing communication efficacy w family members)      Patient / Family Goals  Patient / Family Goal #1: \"He swallows totally fine\"  Goal #1 Outcome: Progressing as expected  Short Term Goal # 1: Patient will consume a diet of RG7.TN0 without overt indicators of aspiration or decline in pulmonary status.  Short Term Goal # 2: Pt will correctly answer Y/N questions using mutli-modal communication x10 in a session given mod cues from SLP.  Short Term Goal # 3: Pt will correctly identify objects and pictures from a field of 3 given mod cues from SLP x10 in a session.      Manju Zhang, SLP  "

## 2023-03-17 NOTE — THERAPY
"Occupational Therapy   Initial Evaluation     Patient Name: Wicho Hitchcock  Age:  62 y.o., Sex:  male  Medical Record #: [id number]  Today's Date: 3/16/2023     Precautions: Fall Risk, Swallow Precautions    Assessment    Patient is 62 y.o. male with h/o cancer, MI, who presented with progressive speech disturbance, confusion. Work up revealed L parietal temporal lesion; underwent bx on 3/15. Per neurosurgery note, findings show GBM. Pt seen for OT eval. Brother Matt present and supportive. Reports he brought pt to his home 6 weeks ago when he noticed symptoms. Matt reports pt has been sedentary but mostly functional for BADL. He was requiring assist for all I-ADL. This session pt presents with profound expressive and receptive aphasia. He is following ~10% of commands, but able to complete automatic tasks. Strength and balance appear grossly intact. Coordination and task completion appear limited by cognition. Anticipate improved performance in familiar environment. Unclear whether further OT will benefit pt given cognition as primary limiting factor and new dx, but will continue to follow at least until definitive POC in place. Given family has been caring for pt as this level, he could likely return home with them provided they can continue to provide continuous supv for safety.     Plan    Occupational Therapy Initial Treatment Plan   Treatment Interventions: Self Care / Activities of Daily Living, Neuro Re-Education / Balance, Therapeutic Activity, Cognitive Skill Development  Treatment Frequency: 3 Times per Week  Duration: Until Therapy Goals Met    DC Equipment Recommendations: None  Discharge Recommendations: Depends on prognosis, family goals and family availability to care for pt     Subjective    \"This one. This one. Yes sir. Yes sir.\"      Objective       03/16/23 1210   Prior Living Situation   Prior Services None;Home-Independent   Housing / Facility [address] [facility]   Steps Into Home 1   Steps In Home 0 "   Bathroom Set up Walk In Shower;Bathtub / Shower Combination  (walk-in shower has built-in bench)   Equipment Owned Hand Held Shower   Comments Pt has been living with brother and NORRIS since onset of symptoms. Brother has been assisting with all I-ADL.   Prior Level of ADL Function   Comments Prior to onset of cog-linguistic symptoms, pt was completely independent with BADL   Prior Level of IADL Function   Prior Level Of Mobility Independent Without Device in Community;Independent Without Device in Home   Driving / Transportation Driving Independent   Occupation (Pre-Hospital Vocational) Not Employed  (brother reports pt was recently terminated from his job; he believes it was related to early onset of symptoms)   Leisure Interests   (Fishing)   Comments Prior to onset of cog-linguistic symptoms, pt was completely independent with I-ADL and functional mobility   Cognition    Speech/ Communication Expressive Aphasia;Receptive Aphasia   Level of Consciousness Alert   Ability To Follow Commands Unable to Follow 1 Step Commands  (pt following ~10% verbal or gestural commands)   Safety Awareness Impaired   New Learning Impaired   Attention Impaired   Sequencing Impaired   Initiation Impaired   Comments Pt presents with profound aphasia; able to complete some basic automatic tasks such as ambulate   Active ROM Upper Body   Active ROM Upper Body  WDL   Dominant Hand Right   Comments able to follow basic formal testing with max verbal and tactile cues; moving BUE spontaneously   Strength Upper Body   Upper Body Strength  WDL   Comments able to follow basic formal testing with max verbal and tactile cues; moving BUE spontaneously   Sensation Upper Body   Upper Extremity Sensation  Not Tested   Comments due to aphasia   Coordination Upper Body   Comments Intact for basic ADL; unable to follow formal testing   Balance Assessment   Sitting Balance (Static) Good   Sitting Balance (Dynamic) Fair   Standing Balance (Static) Fair    Standing Balance (Dynamic) Fair -   Weight Shift Sitting Fair   Weight Shift Standing Fair   Comments no AD, no LOB   Bed Mobility    Supine to Sit Contact Guard Assist   Sit to Supine Standby Assist   Scooting Standby Assist  (seated)   Comments HOB elevated   ADL Assessment   Grooming Minimal Assist;Standing  (oral care at sink; pt failed to apply toothpaste and only brushed for a few seconds)   Lower Body Dressing Maximal Assist  (don B socks; appears physically capable, but limited by cognition)   Toileting   (NT; condom cath (pt requesting to urinate, but not comprehending presence of condom cath))   Functional Mobility   Sit to Stand Standby Assist   Bed, Chair, Wheelchair Transfer Standby Assist   Transfer Method Stand Step  (no AD)   Mobility Supine > < EOB, short gait on unit   Visual Perception   Visual Perception  Not Tested   Comments unable to follow testing; pt tracks well, no overt difficulty navigating during gait   Short Term Goals   Short Term Goal # 1 Pt will follow 1-step commands 50% during functional ADL   Short Term Goal # 2 Pt will complete standing grooming with supv   Short Term Goal # 3 Pt will complete toileting with supv   Short Term Goal # 4 Pt will complete LB dressing with supv

## 2023-03-17 NOTE — PROGRESS NOTES
Monitor Summary: SR 72-89, ND 0.17, QRS 0.08, QT 0.38, with rare PVCs per strip from monitor room.

## 2023-03-17 NOTE — PROGRESS NOTES
Neurosurgery Progress Note    Subjective:  No acute events overnight  Echo pending        Exam:  Awake, sitting up in bed. Expressive aphasia  3mm PERRL, EOMI.  No facial droop.   Hearing intact.   Requires some repeat direction for commands, follows visual directions well  Strength: Bilateral bicep, tricep, deltoid,  5/5.                   Bilateral IP, DF, PF 5/5.  Sensation: Intact throughout.   Incision: dressing c/d/i with staples intact. No redness, drainage, swelling.         BP  Min: 103/70  Max: 164/92  Pulse  Av.4  Min: 61  Max: 91  Resp  Av.9  Min: 9  Max: 35  Temp  Av.4 °C (97.6 °F)  Min: 36.3 °C (97.3 °F)  Max: 36.6 °C (97.9 °F)  SpO2  Av.9 %  Min: 93 %  Max: 97 %    No data recorded    Recent Labs     03/15/23  0838 23  0545 23  0015   WBC 13.3* 14.7* 12.9*   RBC 4.76 4.56* 4.50*   HEMOGLOBIN 15.4 14.8 14.5   HEMATOCRIT 45.2 44.1 42.4   MCV 95.0 96.7 94.2   MCH 32.4 32.5 32.2   MCHC 34.1 33.6* 34.2   RDW 47.1 46.5 46.5   PLATELETCT 200 184 192   MPV 9.1 9.2 9.5       Recent Labs     03/15/23  0838 23  0545 23  0015   SODIUM 139 135 136   POTASSIUM 4.2 4.4 4.2   CHLORIDE 107 104 104   CO2 22 23 21   GLUCOSE 92 113* 175*   BUN 33* 23* 23*   CREATININE 1.07 0.89 0.85   CALCIUM 8.7 8.2* 8.2*       Recent Labs     03/15/23  1045   APTT <20.0*   INR 1.03             Intake/Output                         23 0700 - 23 0659 23 0700 - 23 0659     [id number] 0313-2186 Total 4756-3598 4360-7654 Total                 Intake    P.O.  340  -- 340  360  -- 360    P.O. 340 -- 340 360 -- 360    I.V.  1388.2  1000 2388.2  --  -- --    Volume (mL) (NS infusion) 1388.2 1000 2388.2 -- -- --    IV Piggyback  200  -- 200  --  -- --    Volume (mL) (ceFAZolin (Ancef) 2 g in  mL IVPB) 200 -- 200 -- -- --    Total Intake 1928.2 1000 2928.2 360 -- 360       Output    Urine  1125  0160 2965  --  -- --    Number of Times Incontinent of Urine 1 x -- 1 x --  -- --    Straight Catheter 700 -- 700 -- -- --    Output (mL) ([REMOVED] External Urinary Catheter 03/16/23 1245) 0 -- 0 -- -- --    Output (mL) (Urethral Catheter Non-latex 16 Fr.) 425 1550 1975 -- -- --    Stool  --  -- --  --  -- --    Number of Times Stooled 0 x -- 0 x -- -- --    Total Output 1125 1550 2675 -- -- --       Net I/O     803.2 -550 253.2 360 -- 360              Intake/Output Summary (Last 24 hours) at 3/17/2023 1203  Last data filed at 3/17/2023 1000  Gross per 24 hour   Intake 2278.3 ml   Output 2675 ml   Net -396.7 ml               artificial tears  1 Application. PRN    dexamethasone  6 mg Q8HRS    NS   Continuous    Pharmacy Consult Request  1 Each PHARMACY TO DOSE    MD ALERT...DO NOT ADMINISTER NSAIDS or ASPIRIN unless ORDERED By Neurosurgery  1 Each PRN    ondansetron  4 mg Q4HRS PRN    labetalol  10 mg Q HOUR PRN    cloNIDine  0.1 mg Q4HRS PRN    docusate sodium  100 mg BID    senna-docusate  1 Tablet Nightly    senna-docusate  1 Tablet Q24HRS PRN    polyethylene glycol/lytes  1 Packet BID PRN    magnesium hydroxide  30 mL QDAY PRN    bisacodyl  10 mg Q24HRS PRN    sodium phosphate  1 Each Once PRN    acetaminophen  1,000 mg Q6HRS    Followed by    [START ON 3/20/2023] acetaminophen  1,000 mg Q6HRS PRN    oxyCODONE immediate-release  5 mg Q3HRS PRN    Or    oxyCODONE immediate-release  10 mg Q3HRS PRN    Or    morphine injection  2-4 mg Q3HRS PRN    benzocaine-menthol  1 Lozenge Q2HRS PRN    famotidine  20 mg BID    diphenhydrAMINE  25 mg Q6HRS PRN    Or    diphenhydrAMINE  25 mg Q6HRS PRN    levETIRAcetam  500 mg BID       Assessment and Plan:  Hospital day #1  POD #1  Prophylactic anticoagulation: no         Start date/time: TBD    Plan:  Stable neuro exam  Pt with expressive aphasia, same as prior to surgery, maybe slightly worse  Dec 6mg Q8  PT/OT appreciate clearer recs for dc planning   PT/OT evals pending  Final Path: GBM  Discussed with Dr Li   Likely in house this weekend due  to DC planning   Discussed with brother at bedside

## 2023-03-17 NOTE — THERAPY
Physical Therapy   Initial Evaluation     Patient Name: Wicho Hitchcock  Age:  62 y.o., Sex:  male  Medical Record #: [id number]  Today's Date: 3/16/2023     Precautions  Precautions: Fall Risk;Swallow Precautions; expressive aphasia, 10% of commands with gestures, does appear to be able to learn repetitive gestures per brother;     Assessment  Pt presents with impaired activity tolerance, communication and balance associated with chief complaint of expressive aphasia now s/p left temporal parietal brain biopsy, current path demonstrating high grade glioma; per brother at bedside, pt was able to communicate relatively with gestures and would perform automatic tasks such as showering; denies falls; brother reporting that if the pathology were to come back as a high grade glio they are familiar with hospice as they just had their father on it at the pt's and brother's sister's home and it is equipped to assist pt in this regards as well; multiple family members are ICU RNs and ER physicians; from a PT perspective, defer to speech and OT for optimal dc location as he could benefit from acute rehab but if overall family/pt goals are to return home, pt is functionally capable of doing so given he has been doing relatively well the last 6 weeks with his 24/7 available assistance;     Plan    Physical Therapy Initial Treatment Plan   Treatment Plan : Bed Mobility, Equipment, Gait Training, Manual Therapy, Neuro Re-Education / Balance, Self Care / Home Evaluation, Stair Training, Therapeutic Activities, Therapeutic Exercise  Treatment Frequency: 3 Times per Week  Duration: Until Therapy Goals Met    DC Equipment Recommendations: none (has all equipment from his father on hospice at his sister's home including hospital bed)   Discharge Recommendations: Other - defer to family for overall goals of care; greater OT and speech needs currently;        Abridged Subjective/Objective     03/16/23 1215   Prior Living Situation   Prior  Services None;Home-Independent   Housing / Facility 1 Story House   Steps Into Home 1   Steps In Home 0   Bathroom Set up Walk In Shower   Equipment Owned Hand Held Shower   Lives with - Patient's Self Care Capacity Sibling   Comments 6 weeks has been living with his brother and his wife; they have been providing 24/7 support for him; pt has been able to perform atuomatic tasks; no falls; had chartered a fishing boat about a month ago and went to visit their mother;   Prior Level of Functional Mobility   Bed Mobility Independent   Transfer Status Independent   Ambulation Independent   Ambulation Distance to tolerance   Assistive Devices Used None   Cognition    Cognition / Consciousness X   Speech/ Communication Expressive Aphasia;Receptive Aphasia   Level of Consciousness Alert   Ability To Follow Commands Unable to Follow 1 Step Commands  (~10 percent; did ok with gestures and feedback)   Safety Awareness Impaired   New Learning Impaired   Attention Impaired   Sequencing Impaired   Initiation Impaired   Comments expressive aphasia, brother reports this as the initial reason for purusing medical care; pt has been substituting numbers for words and per brother super inaccurate and 'if he's correct it would be anomaly'; did recognize the use of pen and able to write with a pen;   Passive ROM Lower Body   Passive ROM Lower Body WDL   Strength Lower Body   Lower Body Strength  X   Comments would not follow itemized testing with mirroring and visual feedback;   Balance Assessment   Sitting Balance (Static) Good   Sitting Balance (Dynamic) Fair +   Standing Balance (Static) Fair   Standing Balance (Dynamic) Fair -   Weight Shift Sitting Good   Weight Shift Standing Good   Comments no UE support in sitting/standing, no loss of balance but needs cues/direction   Bed Mobility    Supine to Sit Contact Guard Assist   Gait Analysis   Gait Level Of Assist Contact Guard Assist   Assistive Device Hand Held Assist   Distance  (Feet) 150   # of Times Distance was Traveled 1   Weight Bearing Status full   Vision Deficits Impacting Mobility per brother reporting onset of right blurred vision   Comments distance limited by therapist; focus of session;   Functional Mobility   Sit to Stand Minimal Assist  (HHA)   Patient / Family Goals    Patient / Family Goal #1 to return home   Short Term Goals    Short Term Goal # 1 Pt will perform supine<>sit from flat HOB/no railing with supervision within 6 visits to ensure independent mobility at home.   Short Term Goal # 2 Pt will perform sit<>stand without assist or device within 6 visits to ensure independent mobility at home.   Short Term Goal # 3 Pt will ambulate x 150ft without device and supervision within 6 visits to ensure independnet mobility at home.   Short Term Goal # 4 Pt will ascend/descend 1 stair with B UE support within 6 visits to ensure independent mobility at home.   Education Group   Role of Physical Therapist Patient Response Patient;Acceptance;Explanation;Demonstration;Action Demonstration;Verbal Demonstration   Additional Comments communication, protein/water in diet; aerobic exercise goals;

## 2023-03-17 NOTE — PROGRESS NOTES
4 Eyes Skin Assessment Completed by JENNIFER Shannon and JENNIFER Cheema.    Head Incision  Ears WDL  Nose WDL  Mouth WDL  Neck WDL  Breast/Chest WDL  Shoulder Blades WDL  Spine WDL  (R) Arm/Elbow/Hand WDL  (L) Arm/Elbow/Hand WDL  Abdomen WDL  Groin WDL  Scrotum/Coccyx/Buttocks Redness and Blanching  (R) Leg WDL  (L) Leg WDL  (R) Heel/Foot/Toe WDL  (L) Heel/Foot/Toe WDL          Devices In Places Tele Box, Blood Pressure Cuff, Pulse Ox, Estes, SCD's, and ILANA's      Interventions In Place Pillows, Barrier Cream, and Pressure Redistribution Mattress    Possible Skin Injury No    Pictures Uploaded Into Epic N/A  Wound Consult Placed N/A  RN Wound Prevention Protocol Ordered No

## 2023-03-17 NOTE — CARE PLAN
The patient is Stable - Low risk of patient condition declining or worsening    Shift Goals  Clinical Goals: SLP evaluation, ambulate, Q2H neuro exam  Patient Goals: CHEMA  Family Goals: CHEMA    Progress made toward(s) clinical / shift goals:  VS stable, patient ambulated unit with PT/OT. Hesitancy voiding. Straight cath x1. Order for re-insertion of marina received. Marina catheter placed 425 mL initial output. Skin intact, report called to Radha RN, all belongings present per patient. Patient transferred and brother updated on transfer.

## 2023-03-17 NOTE — CARE PLAN
"The patient is Stable - Low risk of patient condition declining or worsening    Shift Goals  Clinical Goals: Monitor Neuro Status  Patient Goals: CHEMA  Family Goals: CHEMA    Progress made toward(s) clinical / shift goals: Assumed care of patient at 1915. Unable to assess orientation status of patient, expressive aphasia present, and at times receptive aphasia. Patient is able to say \"yes sir\" \"no\" \"thank you\", mostly at appropriate times. Q4hr neuro checks are being completed. Dressing in place at biopsy site, dressing is CDI. Estes care completed. Fall precautions are in place. Bed is low and locked, bed alarm is on, call light is within reach, hourly rounding continues.      Problem: Fall Risk  Goal: Patient will remain free from falls  Outcome: Progressing  Note: Treaded slippers are on, fall risk sign is outside door, fall risk bracelet is on, patient has been educated on the level of risk and to use call light when needing assistance.      Problem: Pain - Standard  Goal: Alleviation of pain or a reduction in pain to the patient’s comfort goal  Outcome: Progressing     Patient is not progressing towards the following goals:    Problem: Knowledge Deficit - Standard  Goal: Patient and family/care givers will demonstrate understanding of plan of care, disease process/condition, diagnostic tests and medications  Outcome: Not Progressing  Note: Patient is unable to demonstrate understanding of plan of care.      "

## 2023-03-17 NOTE — CARE PLAN
The patient is Stable - Low risk of patient condition declining or worsening    Shift Goals  Clinical Goals: increased communication  Patient Goals: CHEMA  Family Goals: CHEMA    Progress made toward(s) clinical / shift goals:  Assumed care of pt at 0715. Pt resting in bed at this time. AAO to self only. Intermittent receptive aphasia. Will f/u with Neurosurgery to de-escalate Estes catheter. Q4 neuro checks in place, bed low and locked, call bell in reach. Hourly rounding continues.       Problem: Skin Integrity  Goal: Skin integrity is maintained or improved  Outcome: Progressing     Problem: Knowledge Deficit - Standard  Goal: Patient and family/care givers will demonstrate understanding of plan of care, disease process/condition, diagnostic tests and medications  Outcome: Progressing     Problem: Fall Risk  Goal: Patient will remain free from falls  Outcome: Progressing        Patient is not progressing towards the following goals:

## 2023-03-17 NOTE — PROGRESS NOTES
Estes catheter removed at 1315 per neuro surgery [facility]'s, order.  Removed 9 mL water from balloon with syringe. Pt tolerated well. Pt instructed to void within 6 hours. Urinal at bedside. Bladder scan per protocol initiated.       1847: Bladder scan revealed 287mL urine present in bladder. Pt stated he voided earlier with BM, unwitnessed by staff.

## 2023-03-18 NOTE — PROGRESS NOTES
"Report received from AM RN at 1900. Pt sitting up in bed, calm. Patient is alert for assessment. CHEMA orientation due to patient expressive aphasia. Patient responds with \"yes\" and \"no\" but unable to elaborate on needs or questions. Patient unable to use communication board during assessment of needs.  Patient follows all commands. Able to ambulated HH assist to restroom. Patient voided in toilet; unable to measure. Call light within reach. Bed alarm is ON and verified.   "

## 2023-03-18 NOTE — CARE PLAN
The patient is Stable - Low risk of patient condition declining or worsening    Shift Goals  Clinical Goals: increased communication  Patient Goals: go home  Family Goals: CHEMA    Progress made toward(s) clinical / shift goals:  Pt voiding appropriately, continues to have difficulty communicating with expressive and intermittent receptive aphasia. Up to bathroom to void. Discontinued IV fluids and tele monitoring per Neurosurgery APRN, Summer. Fall precautions in place, strip and frame alarm active. Call bell in reach, urinal at bedside for convenience. Hourly rounding in place      Problem: Skin Integrity  Goal: Skin integrity is maintained or improved  Outcome: Progressing     Problem: Fall Risk  Goal: Patient will remain free from falls  Outcome: Progressing     Problem: Knowledge Deficit - Standard  Goal: Patient and family/care givers will demonstrate understanding of plan of care, disease process/condition, diagnostic tests and medications  Outcome: Progressing       Patient is not progressing towards the following goals:

## 2023-03-18 NOTE — PROGRESS NOTES
Neurosurgery Progress Note    Subjective:  No acute events overnight  Echo completed.  Waiting on PT/OT recommendations to evaluate further treatment needs    Exam:  Awake, sleeping up in bed. Expressive aphasia.  3mm PERRL.  No facial droop.   Hearing intact.   Requires repeat direction for commands, follows visual directions well  Strength: Bilateral bicep, tricep, deltoid,  5/5.                   Bilateral IP, DF, PF 5/5.  Sensation: Intact throughout.   Incision: dressing c/d/i with staples intact. No redness, drainage, or swelling noted.         BP  Min: 96/58  Max: 130/81  Pulse  Av.4  Min: 59  Max: 85  Resp  Av  Min: 18  Max: 18  Temp  Av.5 °C (97.7 °F)  Min: 36.2 °C (97.2 °F)  Max: 36.7 °C (98 °F)  SpO2  Av.3 %  Min: 94 %  Max: 97 %    No data recorded    Recent Labs     23  0545 23  0015   WBC 14.7* 12.9*   RBC 4.56* 4.50*   HEMOGLOBIN 14.8 14.5   HEMATOCRIT 44.1 42.4   MCV 96.7 94.2   MCH 32.5 32.2   MCHC 33.6* 34.2   RDW 46.5 46.5   PLATELETCT 184 192   MPV 9.2 9.5       Recent Labs     23  0545 23  0015   SODIUM 135 136   POTASSIUM 4.4 4.2   CHLORIDE 104 104   CO2 23 21   GLUCOSE 113* 175*   BUN 23* 23*   CREATININE 0.89 0.85   CALCIUM 8.2* 8.2*                   Intake/Output                         23 0700 - 23 0659 23 07 - 23 0659     [id number] [id number] Total 1672-7111 0754-8501 Total                 Intake    P.O.  840  480 1320  --  -- --    P.O.  -- -- --    Total Intake  -- -- --       Output    Urine  800  -- 800  400  -- 400    Number of Times Voided -- 3 x 3 x 2 x -- 2 x    Urine Void (mL) -- -- -- 400 -- 400    Output (mL) (Urethral Catheter Non-latex 16 Fr.) 800 -- 800 -- -- --    Stool  --  -- --  --  -- --    Number of Times Stooled 1 x -- 1 x -- -- --    Total Output 800 -- 800 400 -- 400       Net I/O     40 480 520 -400 -- -400              Intake/Output Summary (Last 24 hours) at  3/18/2023 1334  Last data filed at 3/18/2023 1238  Gross per 24 hour   Intake 960 ml   Output 400 ml   Net 560 ml         $ Bladder Scan Results (mL): 287     artificial tears  1 Application. PRN    dexamethasone  6 mg Q8HRS    Pharmacy Consult Request  1 Each PHARMACY TO DOSE    MD ALERT...DO NOT ADMINISTER NSAIDS or ASPIRIN unless ORDERED By Neurosurgery  1 Each PRN    ondansetron  4 mg Q4HRS PRN    labetalol  10 mg Q HOUR PRN    cloNIDine  0.1 mg Q4HRS PRN    docusate sodium  100 mg BID    senna-docusate  1 Tablet Nightly    senna-docusate  1 Tablet Q24HRS PRN    polyethylene glycol/lytes  1 Packet BID PRN    magnesium hydroxide  30 mL QDAY PRN    bisacodyl  10 mg Q24HRS PRN    sodium phosphate  1 Each Once PRN    acetaminophen  1,000 mg Q6HRS    Followed by    [START ON 3/20/2023] acetaminophen  1,000 mg Q6HRS PRN    oxyCODONE immediate-release  5 mg Q3HRS PRN    Or    oxyCODONE immediate-release  10 mg Q3HRS PRN    Or    morphine injection  2-4 mg Q3HRS PRN    benzocaine-menthol  1 Lozenge Q2HRS PRN    famotidine  20 mg BID    diphenhydrAMINE  25 mg Q6HRS PRN    Or    diphenhydrAMINE  25 mg Q6HRS PRN    levETIRAcetam  500 mg BID       Assessment and Plan:  Hospital day #2  POD #2  Prophylactic anticoagulation: no         Start date/time: TBD    Plan:  Stable neuro exam  Pt with expressive aphasia, same as prior to surgery, maybe slightly worse  Dec 6mg Q8, will plan for taper on dc.  PT/OT appreciate clearer recs for dc planning   Final Path: GBM  Possible stay over the weekend due to DC planning

## 2023-03-18 NOTE — CARE PLAN
The patient is Stable - Low risk of patient condition declining or worsening    Shift Goals  Clinical Goals: Communicate needs efectively  Patient Goals: CHEMA  Family Goals: CHEMA    Progress made toward(s) clinical / shift goals:  Patient bed alarm ON for impulsiveness    Problem: Knowledge Deficit - Standard  Goal: Patient and family/care givers will demonstrate understanding of plan of care, disease process/condition, diagnostic tests and medications  Outcome: Progressing     Problem: Skin Integrity  Goal: Skin integrity is maintained or improved  Outcome: Progressing     Problem: Fall Risk  Goal: Patient will remain free from falls  Outcome: Progressing     Problem: Pain - Standard  Goal: Alleviation of pain or a reduction in pain to the patient’s comfort goal  Outcome: Progressing       Patient is not progressing towards the following goals:

## 2023-03-18 NOTE — PROGRESS NOTES
Monitor Summary: SR 61-81, [place] .17, QRS .08, QT .30 with rare PVC,5 beats V-tach per strip from monitor room

## 2023-03-19 NOTE — CARE PLAN
The patient is Stable - Low risk of patient condition declining or worsening    Shift Goals  Clinical Goals: Neuro checks q 4h  Patient Goals: rest/ go home  Family Goals: CHEMA    Progress made toward(s) clinical / shift goals:  progressing  Instructed on plan of care disease process, meds.  Skin integrity--has left side head incision with dressing in place.  Fall risk protocol in place.  No pain reported.    Patient is not progressing towards the following goals:

## 2023-03-19 NOTE — CARE PLAN
The patient is Stable - Low risk of patient condition declining or worsening    Shift Goals  Clinical Goals: Ambulation  Patient Goals: Rest, go home  Family Goals: Update on NOC    Progress made toward(s) clinical / shift goals:  Assumed care of pt at 0715. Pt resting in bed at this time. Expressive aphasia noted. Pt up to bathroom with successful BM. Q4 neuro checks in place, fall precautions in place, call bell in reach.       Problem: Knowledge Deficit - Standard  Goal: Patient and family/care givers will demonstrate understanding of plan of care, disease process/condition, diagnostic tests and medications  Outcome: Progressing     Problem: Skin Integrity  Goal: Skin integrity is maintained or improved  Outcome: Progressing     Problem: Fall Risk  Goal: Patient will remain free from falls  Outcome: Progressing     Problem: Pain - Standard  Goal: Alleviation of pain or a reduction in pain to the patient’s comfort goal  Outcome: Progressing       Patient is not progressing towards the following goals:

## 2023-03-19 NOTE — PROGRESS NOTES
Received patient resting in bed. No complaint of pain at this time. PIV to arm patent with dressing intact. Bed alarm in place due to moderate fall risk. Patient with expressive and receptive aphasia, follows commands but difficult to express needs. Bed in low position, wheels locked, side rails up x3, call light and personal items within reach.

## 2023-03-19 NOTE — PROGRESS NOTES
Monitor Summary: SB/SR 52-73  with rare PVCs and couplets per strip from monitor room.    Discontinued at 1314 per strip from monitor room.

## 2023-03-19 NOTE — PROGRESS NOTES
Neurosurgery Progress Note    Subjective:  No acute events overnight  Waiting on PT/OT recommendations to evaluate further treatment needs    Exam:  Awake, up in bed. Expressive aphasia.  3mm PERRL.  No facial droop.   Hearing intact.   Requires repeat direction for commands, follows visual directions well  Strength: Bilateral bicep, tricep, deltoid,  5/5.                   Bilateral IP, DF, PF 5/5.  Sensation: Intact throughout.   Incision: dressing c/d/i with staples intact. No redness, drainage, or swelling noted.         BP  Min: 111/71  Max: 128/82  Pulse  Av.2  Min: 61  Max: 77  Resp  Av  Min: 16  Max: 18  Temp  Av.3 °C (97.4 °F)  Min: 36.3 °C (97.3 °F)  Max: 36.4 °C (97.5 °F)  Monitored Temp 2  Av.5 °C (97.7 °F)  Min: 36.4 °C (97.5 °F)  Max: 36.6 °C (97.9 °F)  SpO2  Av.6 %  Min: 94 %  Max: 97 %    No data recorded    Recent Labs     23  0015   WBC 12.9*   RBC 4.50*   HEMOGLOBIN 14.5   HEMATOCRIT 42.4   MCV 94.2   MCH 32.2   MCHC 34.2   RDW 46.5   PLATELETCT 192   MPV 9.5       Recent Labs     23  0015   SODIUM 136   POTASSIUM 4.2   CHLORIDE 104   CO2 21   GLUCOSE 175*   BUN 23*   CREATININE 0.85   CALCIUM 8.2*                   Intake/Output                         23 - 23 0659 23 - 23 0659      Total  Total                 Intake    P.O.  --  120 120  --  -- --    P.O. -- 120 120 -- -- --    Total Intake -- 120 120 -- -- --       Output    Urine  600  800 1400  425  -- 425    Number of Times Voided 3 x -- 3 x 1 x -- 1 x    Urine Void (mL)  425 -- 425    Stool  --  -- --  --  -- --    Number of Times Stooled -- -- -- 1 x -- 1 x    Total Output  425 -- 425       Net I/O     -600 -680 -1280 -425 -- -425              Intake/Output Summary (Last 24 hours) at 3/19/2023 1345  Last data filed at 3/19/2023 1255  Gross per 24 hour   Intake 120 ml   Output 1425 ml   Net -1305 ml                artificial tears  1 Application. PRN    dexamethasone  6 mg Q8HRS    Pharmacy Consult Request  1 Each PHARMACY TO DOSE    MD ALERT...DO NOT ADMINISTER NSAIDS or ASPIRIN unless ORDERED By Neurosurgery  1 Each PRN    ondansetron  4 mg Q4HRS PRN    labetalol  10 mg Q HOUR PRN    cloNIDine  0.1 mg Q4HRS PRN    docusate sodium  100 mg BID    senna-docusate  1 Tablet Nightly    senna-docusate  1 Tablet Q24HRS PRN    polyethylene glycol/lytes  1 Packet BID PRN    magnesium hydroxide  30 mL QDAY PRN    bisacodyl  10 mg Q24HRS PRN    sodium phosphate  1 Each Once PRN    acetaminophen  1,000 mg Q6HRS    Followed by    [START ON 3/20/2023] acetaminophen  1,000 mg Q6HRS PRN    oxyCODONE immediate-release  5 mg Q3HRS PRN    Or    oxyCODONE immediate-release  10 mg Q3HRS PRN    Or    morphine injection  2-4 mg Q3HRS PRN    benzocaine-menthol  1 Lozenge Q2HRS PRN    famotidine  20 mg BID    diphenhydrAMINE  25 mg Q6HRS PRN    Or    diphenhydrAMINE  25 mg Q6HRS PRN    levETIRAcetam  500 mg BID       Assessment and Plan:  Hospital day #3  POD #3  Prophylactic anticoagulation: no         Start date/time: TBD    Plan:  Stable neuro exam  Pt with expressive aphasia, same as prior to surgery and yesterday.  Dec 6mg Q8, will plan for taper on dc.  PT/OT appreciate clearer recs for dc planning   Final Path: GBM  Possible dc tomorrow pending PT/OT recommendations.

## 2023-03-20 NOTE — DISCHARGE INSTRUCTIONS
Keep incision clean and dry. No dressing required over incision.   OK to shower and pat dry.   Do not soak incision in bath, hot tub, etc.   Take over the counter stool softener daily with pain medication.   Do not lift anything over 10 pounds. No repetitive bending, lifting, twisting, movements.   No Aspirin or anticoagulants until cleared by Neurosurgery.   No driving if taking narcotic medication.   Follow up with [facility] Neurosurgery in 2 weeks as scheduled,       Or call for an appointment [phone]     Rx Decadron 4mg and Keppra sent to patient's pharmacy [facility]

## 2023-03-20 NOTE — CARE PLAN
The patient is Stable - Low risk of patient condition declining or worsening    Shift Goals  Clinical Goals: Ambulation  Patient Goals: Rest, go home  Family Goals: Update on NOC    Progress made toward(s) clinical / shift goals:    Problem: Knowledge Deficit - Standard  Goal: Patient and family/care givers will demonstrate understanding of plan of care, disease process/condition, diagnostic tests and medications  Outcome: Progressing     Problem: Skin Integrity  Goal: Skin integrity is maintained or improved  Outcome: Progressing     Problem: Fall Risk  Goal: Patient will remain free from falls  Outcome: Progressing     Problem: Pain - Standard  Goal: Alleviation of pain or a reduction in pain to the patient’s comfort goal  Outcome: Progressing       Patient is not progressing towards the following goals:

## 2023-03-20 NOTE — TELEPHONE ENCOUNTER
Spoke to Matt.  He states that Wicho seems to be declining, loosing balance and more commonly incontinent.  Working on plan for hospice referrral.

## 2023-03-20 NOTE — CARE PLAN
The patient is Stable - Low risk of patient condition declining or worsening    Shift Goals  Clinical Goals: Ambulation  Patient Goals: Rest, go home  Family Goals: Update on NOC    Progress made toward(s) clinical / shift goals:  adequate for discharge    Patient is not progressing towards the following goals:

## 2023-03-28 NOTE — DISCHARGE SUMMARY
Discharge Summary    CHIEF COMPLAINT ON ADMISSION  No chief complaint on file.      Reason for Admission  Neoplasm of unspecified behavior o*     Admission Date  3/15/2023    CODE STATUS  Prior    HPI & HOSPITAL COURSE  This is a 62 y.o. male here with increasing confusion. Imaging demonstrated 4cm enhancing lesion. He underwent stereotactic biopsy for tissue diagnosis. Postoperatively he was cleared by PT/OT for home discharge. He is being closely followed with on outpatient basis.   No notes on file    Therefore, he is discharged in fair and stable condition to home with close outpatient follow-up.    The patient met 2-midnight criteria for an inpatient stay at the time of discharge.    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 *      FOLLOW UP  Future Appointments   Date Time Provider Department Center   4/6/2023  9:00 AM Geovanny Piper M.D. [facility] M.D[facility]  [address] [facility]  [place] [address]  [phone]    Follow up in 1 week(s)      Geovanny Piper M.D.  [address]   [place] [id number] [phone]            MEDICATIONS ON DISCHARGE     Medication List        CONTINUE taking these medications        Instructions   dexamethasone 4 MG Tabs  Commonly known as: DECADRON   Take 4 mg by mouth 3 times a day.  Dose: 4 mg     levETIRAcetam 500 MG Tabs  Commonly known as: KEPPRA   Take 1 Tablet by mouth 2 times a day.  Dose: 500 mg              Allergies  No Known Allergies    DIET  No orders of the defined types were placed in this encounter.      ACTIVITY  As tolerated.  Weight bearing as tolerated    CONSULTATIONS  NA      PROCEDURES  Stereotactic left parietal brain biopsy, stealth intraoperative navigation, auto guide robotic navigation (Simple-Fill)    LABORATORY  Lab Results   Component Value Date    SODIUM 136 03/17/2023    POTASSIUM 4.2 03/17/2023    CHLORIDE 104 03/17/2023    CO2 21 03/17/2023    GLUCOSE 175 (H) 03/17/2023    BUN 23 (H) 03/17/2023    CREATININE 0.85 03/17/2023        Lab Results   Component Value Date    WBC 12.9 (H) 03/17/2023    HEMOGLOBIN 14.5 03/17/2023    HEMATOCRIT 42.4 03/17/2023    PLATELETCT 192 03/17/2023        Total time of the discharge process exceeds 20  minutes.

## 2023-05-28 NOTE — ANESTHESIA TIME REPORT
Anesthesia Start and Stop Event Times     Date Time Event    3/15/2023 1207 Ready for Procedure     1209 Anesthesia Start     1409 Anesthesia Stop        Responsible Staff  03/15/23    Name Role Begin End    Yannick Gentile M.D. Anesth 1209 1409        Overtime Reason:  no overtime (within assigned shift)    Comments:                                                      
28-May-2023 08:51

## (undated) DEVICE — SUCTION INSTRUMENT YANKAUER BULBOUS TIP W/O VENT (50EA/CA)

## (undated) DEVICE — SUTURE GENERAL

## (undated) DEVICE — ELECTRODE DUAL RETURN W/ CORD - (50/PK)

## (undated) DEVICE — GLOVE BIOGEL PI INDICATOR SZ 7.0 SURGICAL PF LF - (50/BX 4BX/CA)

## (undated) DEVICE — Device

## (undated) DEVICE — SET LEADWIRE 5 LEAD BEDSIDE DISPOSABLE ECG (1SET OF 5/EA)

## (undated) DEVICE — ROBOTIC SURGERY SERVICES

## (undated) DEVICE — DRESSING TRANSPARENT FILM TEGADERM 2.375 X 2.75"  (100EA/BX)"

## (undated) DEVICE — COVER PROBE STERILE CONE (12EA/CA)

## (undated) DEVICE — LACTATED RINGERS INJ. 500 ML - (24EA/CA)

## (undated) DEVICE — NEEDLE NON SAFETY HYPO 22 GA X 1 1/2 IN (100/BX)

## (undated) DEVICE — CANISTER SUCTION 3000ML MECHANICAL FILTER AUTO SHUTOFF MEDI-VAC NONSTERILE LF DISP  (40EA/CA)

## (undated) DEVICE — DRAPE STRLE REG TOWEL 18X24 - (10/BX 4BX/CA)"

## (undated) DEVICE — GLOVE SZ 6.5 BIOGEL PI MICRO - PF LF (50PR/BX)

## (undated) DEVICE — BLADE CLIPPER FITS 2501 CLIPPER (BLUE)  (20EA/CA)

## (undated) DEVICE — SET EXTENSION WITH 2 PORTS (48EA/CA) ***PART #2C8610 IS A SUBSTITUTE*****

## (undated) DEVICE — COVER LIGHT HANDLE ALC PLUS DISP (18EA/BX)

## (undated) DEVICE — LACTATED RINGERS INJ 1000 ML - (14EA/CA 60CA/PF)

## (undated) DEVICE — MIDAS LUBRICATOR DIFFUSER PACK (4EA/CA)

## (undated) DEVICE — SUTURE 3-0 VICRYL PLUS SH - 8X 18 INCH (12/BX)

## (undated) DEVICE — DRAPE IOBAN II INCISE 23X17 - (10EA/BX 4BX/CA)

## (undated) DEVICE — GOWN WARMING STANDARD FLEX - (30/CA)

## (undated) DEVICE — FORCEPS ELECTROSURGICAL DISPOSABLE CODMAN 8IN 1.5MM

## (undated) DEVICE — SLEEVE, VASO, THIGH, MED

## (undated) DEVICE — SODIUM CHL IRRIGATION 0.9% 1000ML (12EA/CA)

## (undated) DEVICE — PACK CRANI - (1EA/CA)

## (undated) DEVICE — TOWEL STOP TIMEOUT SAFETY FLAG (40EA/CA)

## (undated) DEVICE — FORCEPS IRRIGATING 8 X 1.5MM (5EA/BX)

## (undated) DEVICE — GLOVESZ 8.5 BIOGEL PI MICRO - PF LF (50PR/BX)

## (undated) DEVICE — DRAPE T CRANIOTOMY W/POUCH - (9/CA)

## (undated) DEVICE — PERFORATER DISP TIP DGR-0

## (undated) DEVICE — GLOVE PROTEXIS PI MICRO SZ 8.5 (200PR/CA)

## (undated) DEVICE — COVER PROBE INTRAOPERATIVE KIT (10EA/CA)

## (undated) DEVICE — KIT BIOPSY NEEDLE

## (undated) DEVICE — TRAY SRGPRP PVP IOD WT PRP - (20/CA)

## (undated) DEVICE — KIT SURGIFLO W/OUT THROMBIN - (6EA/CA)

## (undated) DEVICE — CHLORAPREP 26 ML APPLICATOR - ORANGE TINT(25/CA)

## (undated) DEVICE — DRESSING XEROFORM 1X8 - (50/BX 4BX/CA)

## (undated) DEVICE — BOVIE BLADE COATED &INSULATED - 25/PK

## (undated) DEVICE — DRESSING NON-ADHERING 8 X 3 - (50/BX)

## (undated) DEVICE — BLADE SURGICAL CLIPPER - (50EA/CA)

## (undated) DEVICE — SPHERE NAVIGATION STEALTH (5EA/TY 12TY/PK)

## (undated) DEVICE — TUBING C&T SET FLYING LEADS DRAIN TUBING (10EA/BX)

## (undated) DEVICE — TUBE CONNECT SUCTION CLEAR 120 X 1/4" (50EA/CA)"

## (undated) DEVICE — TUBING CLEARLINK DUO-VENT - C-FLO (48EA/CA)